# Patient Record
Sex: MALE | Race: WHITE | NOT HISPANIC OR LATINO | Employment: OTHER | URBAN - METROPOLITAN AREA
[De-identification: names, ages, dates, MRNs, and addresses within clinical notes are randomized per-mention and may not be internally consistent; named-entity substitution may affect disease eponyms.]

---

## 2023-03-25 ENCOUNTER — ANESTHESIA (INPATIENT)
Dept: PERIOP | Facility: HOSPITAL | Age: 78
End: 2023-03-25

## 2023-03-25 ENCOUNTER — APPOINTMENT (EMERGENCY)
Dept: RADIOLOGY | Facility: HOSPITAL | Age: 78
End: 2023-03-25

## 2023-03-25 ENCOUNTER — HOSPITAL ENCOUNTER (INPATIENT)
Facility: HOSPITAL | Age: 78
LOS: 2 days | Discharge: NURSING, REHAB OR LONG-TERM CARE | End: 2023-03-27
Attending: EMERGENCY MEDICINE | Admitting: INTERNAL MEDICINE

## 2023-03-25 ENCOUNTER — APPOINTMENT (INPATIENT)
Dept: RADIOLOGY | Facility: HOSPITAL | Age: 78
End: 2023-03-25

## 2023-03-25 ENCOUNTER — ANESTHESIA EVENT (INPATIENT)
Dept: PERIOP | Facility: HOSPITAL | Age: 78
End: 2023-03-25

## 2023-03-25 ENCOUNTER — APPOINTMENT (INPATIENT)
Dept: NON INVASIVE DIAGNOSTICS | Facility: HOSPITAL | Age: 78
End: 2023-03-25

## 2023-03-25 DIAGNOSIS — S72.141A CLOSED DISPLACED INTERTROCHANTERIC FRACTURE OF RIGHT FEMUR, INITIAL ENCOUNTER (HCC): Primary | ICD-10-CM

## 2023-03-25 DIAGNOSIS — T14.8XXA FRACTURE: ICD-10-CM

## 2023-03-25 DIAGNOSIS — D64.9 ANEMIA: ICD-10-CM

## 2023-03-25 DIAGNOSIS — S72.009A HIP FRACTURE (HCC): ICD-10-CM

## 2023-03-25 DIAGNOSIS — F20.9 SCHIZOPHRENIA (HCC): ICD-10-CM

## 2023-03-25 DIAGNOSIS — K21.9 GERD (GASTROESOPHAGEAL REFLUX DISEASE): ICD-10-CM

## 2023-03-25 DIAGNOSIS — R94.31 ABNORMAL EKG: ICD-10-CM

## 2023-03-25 DIAGNOSIS — K59.00 CONSTIPATION: ICD-10-CM

## 2023-03-25 DIAGNOSIS — R93.89 ABNORMAL CT SCAN: ICD-10-CM

## 2023-03-25 DIAGNOSIS — E83.42 HYPOMAGNESEMIA: ICD-10-CM

## 2023-03-25 PROBLEM — D72.829 LEUKOCYTOSIS: Status: ACTIVE | Noted: 2023-03-25

## 2023-03-25 PROBLEM — S72.001A CLOSED RIGHT HIP FRACTURE (HCC): Status: ACTIVE | Noted: 2023-03-25

## 2023-03-25 PROBLEM — M25.559 HIP PAIN: Status: ACTIVE | Noted: 2023-03-25

## 2023-03-25 PROBLEM — I10 HYPERTENSION: Status: ACTIVE | Noted: 2023-03-25

## 2023-03-25 LAB
ABO GROUP BLD: NORMAL
ABO GROUP BLD: NORMAL
ALBUMIN SERPL BCP-MCNC: 3.2 G/DL (ref 3.5–5)
ALP SERPL-CCNC: 89 U/L (ref 34–104)
ALT SERPL W P-5'-P-CCNC: 10 U/L (ref 7–52)
ANION GAP SERPL CALCULATED.3IONS-SCNC: 8 MMOL/L (ref 4–13)
AORTIC ROOT: 3.3 CM
AORTIC VALVE MEAN VELOCITY: 13.8 M/S
APICAL FOUR CHAMBER EJECTION FRACTION: 47 %
APTT PPP: 39 SECONDS (ref 23–37)
AST SERPL W P-5'-P-CCNC: 12 U/L (ref 13–39)
AV AREA BY CONTINUOUS VTI: 2.3 CM2
AV AREA PEAK VELOCITY: 1.8 CM2
AV LVOT MEAN GRADIENT: 3 MMHG
AV LVOT PEAK GRADIENT: 6 MMHG
AV MEAN GRADIENT: 9 MMHG
AV PEAK GRADIENT: 15 MMHG
BASOPHILS # BLD AUTO: 0.04 THOUSANDS/ÂΜL (ref 0–0.1)
BASOPHILS NFR BLD AUTO: 0 % (ref 0–1)
BILIRUB SERPL-MCNC: 0.29 MG/DL (ref 0.2–1)
BLD GP AB SCN SERPL QL: NEGATIVE
BUN SERPL-MCNC: 14 MG/DL (ref 5–25)
CALCIUM ALBUM COR SERPL-MCNC: 9.4 MG/DL (ref 8.3–10.1)
CALCIUM SERPL-MCNC: 8.8 MG/DL (ref 8.4–10.2)
CHLORIDE SERPL-SCNC: 104 MMOL/L (ref 96–108)
CO2 SERPL-SCNC: 26 MMOL/L (ref 21–32)
CREAT SERPL-MCNC: 0.64 MG/DL (ref 0.6–1.3)
DOP CALC AO PEAK VEL: 1.95 M/S
DOP CALC AO VTI: 28.97 CM
DOP CALC LVOT DIAMETER: 1.9 CM
DOP CALC LVOT PEAK VEL VTI: 23.56 CM
DOP CALC LVOT PEAK VEL: 1.25 M/S
DOP CALC LVOT STROKE VOLUME: 67
E WAVE DECELERATION TIME: 254 MS
E/A RATIO: 0.62
EOSINOPHIL # BLD AUTO: 0.12 THOUSAND/ÂΜL (ref 0–0.61)
EOSINOPHIL NFR BLD AUTO: 1 % (ref 0–6)
ERYTHROCYTE [DISTWIDTH] IN BLOOD BY AUTOMATED COUNT: 16.7 % (ref 11.6–15.1)
FERRITIN SERPL-MCNC: 151 NG/ML (ref 8–388)
FOLATE SERPL-MCNC: 15.4 NG/ML (ref 3.1–17.5)
FRACTIONAL SHORTENING: 19 (ref 28–44)
GFR SERPL CREATININE-BSD FRML MDRD: 93 ML/MIN/1.73SQ M
GLUCOSE SERPL-MCNC: 131 MG/DL (ref 65–140)
HCT VFR BLD AUTO: 30.4 % (ref 36.5–49.3)
HGB BLD-MCNC: 9.5 G/DL (ref 12–17)
IMM GRANULOCYTES # BLD AUTO: 0.14 THOUSAND/UL (ref 0–0.2)
IMM GRANULOCYTES NFR BLD AUTO: 1 % (ref 0–2)
INR PPP: 1.17 (ref 0.84–1.19)
INTERVENTRICULAR SEPTUM IN DIASTOLE (PARASTERNAL SHORT AXIS VIEW): 1.2 CM
INTERVENTRICULAR SEPTUM: 1.2 CM (ref 0.6–1.1)
IRON SATN MFR SERPL: 10 % (ref 20–50)
IRON SERPL-MCNC: 22 UG/DL (ref 65–175)
LAAS-AP4: 15.4 CM2
LEFT ATRIUM AREA SYSTOLE SINGLE PLANE A4C: 16.4 CM2
LEFT ATRIUM SIZE: 3.2 CM
LEFT INTERNAL DIMENSION IN SYSTOLE: 3.4 CM (ref 2.1–4)
LEFT VENTRICLE DIASTOLIC VOLUME (MOD BIPLANE): 125 ML
LEFT VENTRICLE SYSTOLIC VOLUME (MOD BIPLANE): 62 ML
LEFT VENTRICULAR INTERNAL DIMENSION IN DIASTOLE: 4.2 CM (ref 3.5–6)
LEFT VENTRICULAR POSTERIOR WALL IN END DIASTOLE: 1.2 CM
LEFT VENTRICULAR STROKE VOLUME: 32 ML
LV EF: 50 %
LVSV (TEICH): 32 ML
LYMPHOCYTES # BLD AUTO: 1.31 THOUSANDS/ÂΜL (ref 0.6–4.47)
LYMPHOCYTES NFR BLD AUTO: 10 % (ref 14–44)
MCH RBC QN AUTO: 26 PG (ref 26.8–34.3)
MCHC RBC AUTO-ENTMCNC: 31.3 G/DL (ref 31.4–37.4)
MCV RBC AUTO: 83 FL (ref 82–98)
MONOCYTES # BLD AUTO: 0.87 THOUSAND/ÂΜL (ref 0.17–1.22)
MONOCYTES NFR BLD AUTO: 7 % (ref 4–12)
MV E'TISSUE VEL-LAT: 14 CM/S
MV E'TISSUE VEL-SEP: 9 CM/S
MV PEAK A VEL: 1.29 M/S
MV PEAK E VEL: 80 CM/S
MV STENOSIS PRESSURE HALF TIME: 74 MS
MV VALVE AREA P 1/2 METHOD: 3
NEUTROPHILS # BLD AUTO: 10.11 THOUSANDS/ÂΜL (ref 1.85–7.62)
NEUTS SEG NFR BLD AUTO: 81 % (ref 43–75)
NRBC BLD AUTO-RTO: 0 /100 WBCS
PLATELET # BLD AUTO: 369 THOUSANDS/UL (ref 149–390)
PMV BLD AUTO: 9.8 FL (ref 8.9–12.7)
POTASSIUM SERPL-SCNC: 4.2 MMOL/L (ref 3.5–5.3)
PROT SERPL-MCNC: 7.1 G/DL (ref 6.4–8.4)
PROTHROMBIN TIME: 15 SECONDS (ref 11.6–14.5)
PV PEAK GRADIENT: 11 MMHG
RBC # BLD AUTO: 3.65 MILLION/UL (ref 3.88–5.62)
RH BLD: POSITIVE
RH BLD: POSITIVE
RIGHT ATRIUM AREA SYSTOLE A4C: 13.8 CM2
RIGHT VENTRICLE ID DIMENSION: 3.4 CM
SL CV LV EF: 45
SL CV PED ECHO LEFT VENTRICLE DIASTOLIC VOLUME (MOD BIPLANE) 2D: 80 ML
SL CV PED ECHO LEFT VENTRICLE SYSTOLIC VOLUME (MOD BIPLANE) 2D: 48 ML
SODIUM SERPL-SCNC: 138 MMOL/L (ref 135–147)
SPECIMEN EXPIRATION DATE: NORMAL
TIBC SERPL-MCNC: 231 UG/DL (ref 250–450)
TR MAX PG: 25 MMHG
TR PEAK VELOCITY: 2.5 M/S
TRICUSPID ANNULAR PLANE SYSTOLIC EXCURSION: 2.1 CM
TRICUSPID VALVE PEAK REGURGITATION VELOCITY: 2.52 M/S
VIT B12 SERPL-MCNC: 277 PG/ML (ref 100–900)
WBC # BLD AUTO: 12.59 THOUSAND/UL (ref 4.31–10.16)

## 2023-03-25 PROCEDURE — 0QS606Z REPOSITION RIGHT UPPER FEMUR WITH INTRAMEDULLARY INTERNAL FIXATION DEVICE, OPEN APPROACH: ICD-10-PCS | Performed by: ORTHOPAEDIC SURGERY

## 2023-03-25 DEVICE — TFNA FENESTRATED HELICAL BLADE 105MM - STERILE
Type: IMPLANTABLE DEVICE | Status: FUNCTIONAL
Brand: TFN-ADVANCE

## 2023-03-25 DEVICE — 12MM/130 DEG TI CANN TFNA 170MM - STERILE
Type: IMPLANTABLE DEVICE | Status: FUNCTIONAL
Brand: TFN-ADVANCE

## 2023-03-25 DEVICE — 5.0MM TI LOCKING SCREW W/T25 STARDRIVE 32MM FOR IM NAILS: Type: IMPLANTABLE DEVICE | Status: FUNCTIONAL

## 2023-03-25 RX ORDER — CEFAZOLIN SODIUM 1 G/50ML
1000 SOLUTION INTRAVENOUS ONCE
Status: DISCONTINUED | OUTPATIENT
Start: 2023-03-25 | End: 2023-03-25 | Stop reason: HOSPADM

## 2023-03-25 RX ORDER — HYDROMORPHONE HCL IN WATER/PF 6 MG/30 ML
0.2 PATIENT CONTROLLED ANALGESIA SYRINGE INTRAVENOUS EVERY 4 HOURS PRN
Status: DISCONTINUED | OUTPATIENT
Start: 2023-03-25 | End: 2023-03-27 | Stop reason: HOSPADM

## 2023-03-25 RX ORDER — CEFAZOLIN SODIUM 2 G/50ML
SOLUTION INTRAVENOUS AS NEEDED
Status: DISCONTINUED | OUTPATIENT
Start: 2023-03-25 | End: 2023-03-25

## 2023-03-25 RX ORDER — MORPHINE SULFATE 4 MG/ML
4 INJECTION, SOLUTION INTRAMUSCULAR; INTRAVENOUS ONCE
Status: COMPLETED | OUTPATIENT
Start: 2023-03-25 | End: 2023-03-25

## 2023-03-25 RX ORDER — AMLODIPINE BESYLATE 5 MG/1
5 TABLET ORAL DAILY
Status: DISCONTINUED | OUTPATIENT
Start: 2023-03-25 | End: 2023-03-27 | Stop reason: HOSPADM

## 2023-03-25 RX ORDER — ENOXAPARIN SODIUM 100 MG/ML
40 INJECTION SUBCUTANEOUS
Status: DISCONTINUED | OUTPATIENT
Start: 2023-03-26 | End: 2023-03-27 | Stop reason: HOSPADM

## 2023-03-25 RX ORDER — CEFAZOLIN SODIUM 1 G/50ML
1000 SOLUTION INTRAVENOUS EVERY 8 HOURS
Status: COMPLETED | OUTPATIENT
Start: 2023-03-25 | End: 2023-03-26

## 2023-03-25 RX ORDER — ACETAMINOPHEN 325 MG/1
650 TABLET ORAL EVERY 6 HOURS PRN
Status: DISCONTINUED | OUTPATIENT
Start: 2023-03-25 | End: 2023-03-27 | Stop reason: HOSPADM

## 2023-03-25 RX ORDER — EPHEDRINE SULFATE 50 MG/ML
INJECTION INTRAVENOUS AS NEEDED
Status: DISCONTINUED | OUTPATIENT
Start: 2023-03-25 | End: 2023-03-25

## 2023-03-25 RX ORDER — CLONAZEPAM 0.5 MG/1
0.25 TABLET ORAL DAILY
Status: DISCONTINUED | OUTPATIENT
Start: 2023-03-25 | End: 2023-03-27 | Stop reason: HOSPADM

## 2023-03-25 RX ORDER — CLONAZEPAM 0.25 MG/1
0.25 TABLET, ORALLY DISINTEGRATING ORAL DAILY
COMMUNITY

## 2023-03-25 RX ORDER — TRANEXAMIC ACID 100 MG/ML
INJECTION, SOLUTION INTRAVENOUS AS NEEDED
Status: DISCONTINUED | OUTPATIENT
Start: 2023-03-25 | End: 2023-03-25

## 2023-03-25 RX ORDER — PROPOFOL 10 MG/ML
INJECTION, EMULSION INTRAVENOUS AS NEEDED
Status: DISCONTINUED | OUTPATIENT
Start: 2023-03-25 | End: 2023-03-25

## 2023-03-25 RX ORDER — METOPROLOL TARTRATE 50 MG/1
50 TABLET, FILM COATED ORAL EVERY 12 HOURS SCHEDULED
COMMUNITY

## 2023-03-25 RX ORDER — LIDOCAINE HYDROCHLORIDE 10 MG/ML
INJECTION, SOLUTION EPIDURAL; INFILTRATION; INTRACAUDAL; PERINEURAL AS NEEDED
Status: DISCONTINUED | OUTPATIENT
Start: 2023-03-25 | End: 2023-03-25

## 2023-03-25 RX ORDER — FENTANYL CITRATE/PF 50 MCG/ML
25 SYRINGE (ML) INJECTION
Status: DISCONTINUED | OUTPATIENT
Start: 2023-03-25 | End: 2023-03-25 | Stop reason: HOSPADM

## 2023-03-25 RX ORDER — CEFAZOLIN SODIUM 1 G/50ML
1000 SOLUTION INTRAVENOUS EVERY 8 HOURS
Status: CANCELLED | OUTPATIENT
Start: 2023-03-25 | End: 2023-03-26

## 2023-03-25 RX ORDER — LOSARTAN POTASSIUM 50 MG/1
50 TABLET ORAL DAILY
COMMUNITY

## 2023-03-25 RX ORDER — OXYCODONE HYDROCHLORIDE 5 MG/1
5 TABLET ORAL EVERY 4 HOURS PRN
Status: DISCONTINUED | OUTPATIENT
Start: 2023-03-25 | End: 2023-03-27 | Stop reason: HOSPADM

## 2023-03-25 RX ORDER — DEXAMETHASONE SODIUM PHOSPHATE 10 MG/ML
INJECTION, SOLUTION INTRAMUSCULAR; INTRAVENOUS AS NEEDED
Status: DISCONTINUED | OUTPATIENT
Start: 2023-03-25 | End: 2023-03-25

## 2023-03-25 RX ORDER — METOPROLOL TARTRATE 50 MG/1
50 TABLET, FILM COATED ORAL EVERY 12 HOURS SCHEDULED
Status: DISCONTINUED | OUTPATIENT
Start: 2023-03-25 | End: 2023-03-27 | Stop reason: HOSPADM

## 2023-03-25 RX ORDER — ONDANSETRON 2 MG/ML
4 INJECTION INTRAMUSCULAR; INTRAVENOUS EVERY 6 HOURS PRN
Status: DISCONTINUED | OUTPATIENT
Start: 2023-03-25 | End: 2023-03-27 | Stop reason: HOSPADM

## 2023-03-25 RX ORDER — DIVALPROEX SODIUM 125 MG/1
125 TABLET, DELAYED RELEASE ORAL EVERY 8 HOURS SCHEDULED
COMMUNITY

## 2023-03-25 RX ORDER — MIDAZOLAM HYDROCHLORIDE 2 MG/2ML
INJECTION, SOLUTION INTRAMUSCULAR; INTRAVENOUS AS NEEDED
Status: DISCONTINUED | OUTPATIENT
Start: 2023-03-25 | End: 2023-03-25

## 2023-03-25 RX ORDER — SODIUM CHLORIDE 9 MG/ML
100 INJECTION, SOLUTION INTRAVENOUS CONTINUOUS
Status: DISCONTINUED | OUTPATIENT
Start: 2023-03-25 | End: 2023-03-26

## 2023-03-25 RX ORDER — CEFAZOLIN SODIUM 1 G/50ML
1000 SOLUTION INTRAVENOUS EVERY 8 HOURS
Status: DISCONTINUED | OUTPATIENT
Start: 2023-03-25 | End: 2023-03-25

## 2023-03-25 RX ORDER — FENTANYL CITRATE 50 UG/ML
INJECTION, SOLUTION INTRAMUSCULAR; INTRAVENOUS AS NEEDED
Status: DISCONTINUED | OUTPATIENT
Start: 2023-03-25 | End: 2023-03-25

## 2023-03-25 RX ORDER — ASPIRIN 81 MG/1
81 TABLET, CHEWABLE ORAL 2 TIMES DAILY
Status: DISCONTINUED | OUTPATIENT
Start: 2023-03-25 | End: 2023-03-26

## 2023-03-25 RX ORDER — AMLODIPINE BESYLATE 5 MG/1
5 TABLET ORAL DAILY
COMMUNITY

## 2023-03-25 RX ORDER — DIVALPROEX SODIUM 125 MG/1
125 TABLET, DELAYED RELEASE ORAL EVERY 8 HOURS SCHEDULED
Status: DISCONTINUED | OUTPATIENT
Start: 2023-03-25 | End: 2023-03-27 | Stop reason: HOSPADM

## 2023-03-25 RX ORDER — DOCUSATE SODIUM 100 MG/1
100 CAPSULE, LIQUID FILLED ORAL 2 TIMES DAILY
Status: DISCONTINUED | OUTPATIENT
Start: 2023-03-25 | End: 2023-03-27 | Stop reason: HOSPADM

## 2023-03-25 RX ADMIN — SODIUM CHLORIDE 100 ML/HR: 0.9 INJECTION, SOLUTION INTRAVENOUS at 22:22

## 2023-03-25 RX ADMIN — TRANEXAMIC ACID 1000 MG: 1 INJECTION, SOLUTION INTRAVENOUS at 07:08

## 2023-03-25 RX ADMIN — TRANEXAMIC ACID 1 G: 1 INJECTION, SOLUTION INTRAVENOUS at 13:55

## 2023-03-25 RX ADMIN — LIDOCAINE HYDROCHLORIDE 50 MG: 10 INJECTION, SOLUTION EPIDURAL; INFILTRATION; INTRACAUDAL; PERINEURAL at 14:18

## 2023-03-25 RX ADMIN — FENTANYL CITRATE 25 MCG: 50 INJECTION, SOLUTION INTRAMUSCULAR; INTRAVENOUS at 14:34

## 2023-03-25 RX ADMIN — FENTANYL CITRATE 25 MCG: 50 INJECTION, SOLUTION INTRAMUSCULAR; INTRAVENOUS at 14:43

## 2023-03-25 RX ADMIN — MORPHINE SULFATE 4 MG: 4 INJECTION INTRAVENOUS at 04:48

## 2023-03-25 RX ADMIN — FENTANYL CITRATE 50 MCG: 50 INJECTION, SOLUTION INTRAMUSCULAR; INTRAVENOUS at 14:18

## 2023-03-25 RX ADMIN — TRANEXAMIC ACID 670 MG: 1 INJECTION, SOLUTION INTRAVENOUS at 06:46

## 2023-03-25 RX ADMIN — DIVALPROEX SODIUM 125 MG: 125 TABLET, DELAYED RELEASE ORAL at 16:03

## 2023-03-25 RX ADMIN — MIDAZOLAM 2 MG: 1 INJECTION INTRAMUSCULAR; INTRAVENOUS at 14:06

## 2023-03-25 RX ADMIN — CEFAZOLIN SODIUM 2000 MG: 2 SOLUTION INTRAVENOUS at 14:15

## 2023-03-25 RX ADMIN — EPHEDRINE SULFATE 10 MG: 50 INJECTION, SOLUTION INTRAVENOUS at 14:32

## 2023-03-25 RX ADMIN — SODIUM CHLORIDE 100 ML/HR: 0.9 INJECTION, SOLUTION INTRAVENOUS at 12:03

## 2023-03-25 RX ADMIN — DOCUSATE SODIUM 100 MG: 100 CAPSULE, LIQUID FILLED ORAL at 12:03

## 2023-03-25 RX ADMIN — DOCUSATE SODIUM 100 MG: 100 CAPSULE, LIQUID FILLED ORAL at 17:15

## 2023-03-25 RX ADMIN — AMLODIPINE BESYLATE 5 MG: 5 TABLET ORAL at 12:03

## 2023-03-25 RX ADMIN — METOPROLOL TARTRATE 50 MG: 50 TABLET, FILM COATED ORAL at 12:03

## 2023-03-25 RX ADMIN — ASPIRIN 81 MG CHEWABLE TABLET 81 MG: 81 TABLET CHEWABLE at 22:02

## 2023-03-25 RX ADMIN — DEXAMETHASONE SODIUM PHOSPHATE 4 MG: 10 INJECTION, SOLUTION INTRAMUSCULAR; INTRAVENOUS at 14:27

## 2023-03-25 RX ADMIN — ONDANSETRON 4 MG: 2 INJECTION INTRAMUSCULAR; INTRAVENOUS at 14:18

## 2023-03-25 RX ADMIN — MORPHINE SULFATE 4 MG: 4 INJECTION INTRAVENOUS at 05:38

## 2023-03-25 RX ADMIN — CEFAZOLIN SODIUM 1000 MG: 1 SOLUTION INTRAVENOUS at 22:02

## 2023-03-25 RX ADMIN — DIVALPROEX SODIUM 125 MG: 125 TABLET, DELAYED RELEASE ORAL at 22:03

## 2023-03-25 RX ADMIN — CLONAZEPAM 0.25 MG: 0.5 TABLET ORAL at 12:03

## 2023-03-25 RX ADMIN — PROPOFOL 150 MG: 10 INJECTION, EMULSION INTRAVENOUS at 14:47

## 2023-03-25 NOTE — OP NOTE
OPERATIVE REPORT  PATIENT NAME: Nelson Phelps    :  1945  MRN: [id number]  Pt Location: [facility] ROOM 03    SURGERY DATE: 3/25/2023    Surgeon(s) and Role:     * Naga Mcdonald MD - Primary     * Monica Kenny PA-C - Assisting    Preop Diagnosis:  Closed displaced intertrochanteric fracture of right femur, initial encounter ([place] [address] ) Jeannette Emerson    Post-Op Diagnosis Codes:     * Closed displaced intertrochanteric fracture of right femur, initial encounter ([place] [address] ) Jeannette Emerson    Procedure(s):  Right - INSERTION NAIL IM FEMUR ANTEGRADE (TROCHANTERIC)    Specimen(s):  * No specimens in log *    Estimated Blood Loss:   Minimal    Drains:  * No LDAs found *    Anesthesia Type:   General    Operative Indications:  Closed displaced intertrochanteric fracture of right femur, initial encounter ([place] [address] ) [S72 141A]    Operative Findings:  Closed displaced comminuted intertrochanteric right femoral fracture    Complications:   None    Procedure and Technique:  After informed surgical consent of been obtained patient was brought to the operating room and administered general anesthesia  He was then placed in the supine position on the operating room table and all questions and restraints were placed  Right lower extremity was held in traction and internal rotation in the left hip was flexed and abducted out of the way  Using the standard starting wound dissection was taken down through skin and subcutaneous tissues to the level of the greater trochanter  The guidewire was placed down the canal and into the distal fragment  The femur was reamed up to 13 5 mm and a Synthes 12 mm trochanteric femoral nail was placed down the canal   The guidewire was placed  I placed helical blade low in the head in order to ensure that we were out of the comminuted segment  This was locked into place  The distal interlocking screw was placed  Final fluoroscopic views were obtained showing everything to be in good position      Wounds were then closed with 2-0 Vicryl staples  Sterile dressings were applied  He was then awakened from general anesthesia and transferred to recovery in stable condition having tolerated the procedure well     I was present for the entire procedure, I was present for all critical portions of the procedure, A qualified resident physician was not available and A physician assistant was required during the procedure for retraction tissue handling,dissection and suturing    Patient Disposition:  PACU         SIGNATURE: Bradly Obregon MD  DATE: March 25, 2023  TIME: 3:00 PM

## 2023-03-25 NOTE — CONSULTS
"Consultation - Orthopedics   Madison Dominguez 65 y o  male MRN: [id number]  Unit/Bed#: ED 10 Encounter: [id number]      Physician Requesting Consult: Sonia Belle MD    Reason for Consult / Principal Problem: Right intertrochanteric femur fracture  HPI:   Madison Dominguez is a 65y o  year old male who sustained a mechanical fall at his assisted living facility earlier this morning  He typically ambulates with a walker and notes he \"lost his balance \"  Due to significant right hip pain he was brought to the emergency department where x-rays and CT showed a comminuted displaced intertrochanteric right femur fracture  The patient notes ongoing pain about the right groin, which is worse with attempted movement and better with rest and pain medication  Patient notes good sensation of the right lower extremity  He denies any chest pain, shortness of breath, or dizziness  Patient does have a history of schizophrenia and hypertension, but denies any other significant past medical history  Patient notes he has lived in assisted living for the last 6 months  ECG done in ED showed  Normal sinus rhythm  Right atrial enlargement  Incomplete right bundle branch block  Possible lateral infarct, age undetermined  Possible inferior infarct, age undetermined  T wave abnormality anteriorly  No STEMI  Hemoglobin 9 5  Consults    Review of Systems   Constitutional: Negative for chills, fever and unexpected weight change  HENT: Negative for hearing loss, nosebleeds and sore throat  Eyes: Negative for pain, redness and visual disturbance  Respiratory: Negative for cough, shortness of breath and wheezing  Cardiovascular: Negative for chest pain, palpitations and leg swelling  Gastrointestinal: Negative for abdominal pain, nausea and vomiting  Endocrine: Negative for polyphagia and polyuria  Genitourinary: Negative for dysuria and hematuria     Musculoskeletal:        See HPI   Skin: Negative for " rash and wound  Neurological: Negative for dizziness, numbness and headaches  Psychiatric/Behavioral: Negative for decreased concentration and suicidal ideas  The patient is not nervous/anxious  Historical Information   Past Medical History:   Diagnosis Date   • Hypertension    • Schizophrenia ([facility] [address] )      Past Surgical History:   Procedure Laterality Date   • CHOLECYSTECTOMY     • TONSILECTOMY AND ADNOIDECTOMY       Social History   Social History     Substance and Sexual Activity   Alcohol Use None     Social History     Substance and Sexual Activity   Drug Use Not on file     Social History     Tobacco Use   Smoking Status Not on file   Smokeless Tobacco Not on file     Family History: No family history on file  Meds/Allergies   all current active meds have been reviewed and current meds:   Current Facility-Administered Medications   Medication Dose Route Frequency   • tranexamic Acid 1,000 mg in sodium chloride 0 9 % 500 mL IVPB  1,000 mg Intravenous Once     No Known Allergies    Objective   Vitals: Blood pressure 145/67, pulse 62, temperature 98 8 °F (37 1 °C), temperature source Oral, resp  rate 18, weight 67 1 kg (147 lb 14 9 oz), SpO2 99 %  ,There is no height or weight on file to calculate BMI  Invasive Devices     Peripheral Intravenous Line  Duration           Peripheral IV 03/25/23 Dorsal (posterior); Right Forearm <1 day    Peripheral IV 03/25/23 Right Antecubital <1 day                Physical Exam  Constitutional:       General: He is not in acute distress  Appearance: He is well-developed  HENT:      Head: Normocephalic and atraumatic  Eyes:      General: No scleral icterus  Conjunctiva/sclera: Conjunctivae normal    Neck:      Vascular: No JVD  Cardiovascular:      Rate and Rhythm: Normal rate  Pulmonary:      Effort: Pulmonary effort is normal  No respiratory distress  Skin:     General: Skin is warm     Neurological:      Mental Status: He is alert and oriented to person, place, and time  Coordination: Coordination normal           Ortho Exam   Patient alert and oriented x3 lying in bed  Patient holds the right lower extremity in a flexed and internally rotated position  Benign appearance of the right hip  Sensation intact right lower extremity  Patient moves foot and ankle freely  2+ DP pulse  All compartments soft  Lab Results: I have personally reviewed pertinent lab results  CBC:   Lab Results   Component Value Date    WBC 12 59 (H) 03/25/2023    HGB 9 5 (L) 03/25/2023    HCT 30 4 (L) 03/25/2023    MCV 83 03/25/2023     03/25/2023    MCH 26 0 (L) 03/25/2023    MCHC 31 3 (L) 03/25/2023    RDW 16 7 (H) 03/25/2023    MPV 9 8 03/25/2023    NRBC 0 03/25/2023     CMP:   Lab Results   Component Value Date     03/25/2023    CO2 26 03/25/2023    BUN 14 03/25/2023    CREATININE 0 64 03/25/2023    CALCIUM 8 8 03/25/2023    AST 12 (L) 03/25/2023    ALT 10 03/25/2023    ALKPHOS 89 03/25/2023    EGFR 93 03/25/2023     PT/INR: No results found for: PT, INR    Imaging Studies: I have personally reviewed pertinent reports  and I have personally reviewed pertinent films in PACS  CT right lower extremity: Comminuted, displaced right intertrochanteric femur fracture  Assessment:  Right intertrochanteric femur fracture  Plan:  The patient has a right intertrochanteric femur fracture that required surgical fixation  The patient will be admitted to the internal medicine service  Dr Lesa Robles plans to perform surgical fixation of this fracture this afternoon  Cardiology has been consulted for clearance as the patient did have abnormal ECG findings  Patient has been NPO  TXA was administered in ED  Nonweightbearing right lower extremity  Analgesia as needed  Patient did come in with baseline anemia  We will continue to monitor hemoglobin postoperatively  Type and screen was ordered        Code Status: No Order  Advance Directive and Living Will:      Power of :    POLST:

## 2023-03-25 NOTE — ASSESSMENT & PLAN NOTE
CT scan of the pelvis incidentally noted a 17 x 14 mm soft tissue nodule noted adjacent to the rectus sigmoid junction, to the right of midline Which may represent enlarged lymph node  Tissue sampling and/or PET/CT was recommended for further evaluation  Discussed findings with the patient, we will recommend follow-up on discharge  Will refer to hematology/oncology as outpatient    Patient reports that he has been following with his physician in the past and may consider to follow-up with his permission after discharge but would appreciate information regarding local provider for referral

## 2023-03-25 NOTE — ANESTHESIA PREPROCEDURE EVALUATION
Procedure:  INSERTION NAIL IM FEMUR ANTEGRADE (TROCHANTERIC) (Right: Leg Upper)    Relevant Problems   CARDIO   (+) Hypertension      HEMATOLOGY   (+) Anemia      NEURO/PSYCH   (+) Schizophrenia (HCC)        Physical Exam    Airway    Mallampati score: III         Dental       Cardiovascular  Rhythm: regular, Rate: normal,     Pulmonary  Breath sounds clear to auscultation,     Other Findings        Anesthesia Plan  ASA Score- 2     Anesthesia Type- general with ASA Monitors  Additional Monitors:   Airway Plan: LMA  Plan Factors-Exercise tolerance (METS): >4 METS  Chart reviewed  EKG reviewed  Existing labs reviewed  Patient summary reviewed  Patient is not a current smoker  Induction- intravenous  Postoperative Plan- Plan for postoperative opioid use  Informed Consent- Anesthetic plan and risks discussed with patient  I personally reviewed this patient with the CRNA  Discussed and agreed on the Anesthesia Plan with the CRNA  Courtney Banuelos

## 2023-03-25 NOTE — PLAN OF CARE
Problem: Potential for Falls  Goal: Patient will remain free of falls  Description: INTERVENTIONS:  - Educate patient/family on patient safety including physical limitations  - Instruct patient to call for assistance with activity   - Consult OT/PT to assist with strengthening/mobility   - Keep Call bell within reach  - Keep bed low and locked with side rails adjusted as appropriate  - Keep care items and personal belongings within reach  - Initiate and maintain comfort rounds  - Make Fall Risk Sign visible to staff  - Offer Toileting every  Hours, in advance of need  - Initiate/Maintain alarm  - Obtain necessary fall risk management equipment:   - Apply yellow socks and bracelet for high fall risk patients  - Consider moving patient to room near nurses station  Outcome: Progressing     Problem: MOBILITY - ADULT  Goal: Maintain or return to baseline ADL function  Description: INTERVENTIONS:  -  Assess patient's ability to carry out ADLs; assess patient's baseline for ADL function and identify physical deficits which impact ability to perform ADLs (bathing, care of mouth/teeth, toileting, grooming, dressing, etc )  - Assess/evaluate cause of self-care deficits   - Assess range of motion  - Assess patient's mobility; develop plan if impaired  - Assess patient's need for assistive devices and provide as appropriate  - Encourage maximum independence but intervene and supervise when necessary  - Involve family in performance of ADLs  - Assess for home care needs following discharge   - Consider OT consult to assist with ADL evaluation and planning for discharge  - Provide patient education as appropriate  Outcome: Progressing  Goal: Maintains/Returns to pre admission functional level  Description: INTERVENTIONS:  - Perform BMAT or MOVE assessment daily    - Set and communicate daily mobility goal to care team and patient/family/caregiver     - Collaborate with rehabilitation services on mobility goals if consulted  - Perform Range of Motion times a day  - Reposition patient every  hours    - Dangle patient  times a day  - Stand patient times a day  - Ambulate patient  times a day  - Out of bed to chair  times a day   - Out of bed for meals  times a day  - Out of bed for toileting  - Record patient progress and toleration of activity level   Outcome: Progressing     Problem: Prexisting or High Potential for Compromised Skin Integrity  Goal: Skin integrity is maintained or improved  Description: INTERVENTIONS:  - Identify patients at risk for skin breakdown  - Assess and monitor skin integrity  - Assess and monitor nutrition and hydration status  - Monitor labs   - Assess for incontinence   - Turn and reposition patient  - Assist with mobility/ambulation  - Relieve pressure over bony prominences  - Avoid friction and shearing  - Provide appropriate hygiene as needed including keeping skin clean and dry  - Evaluate need for skin moisturizer/barrier cream  - Collaborate with interdisciplinary team   - Patient/family teaching  - Consider wound care consult   Outcome: Progressing     Problem: MUSCULOSKELETAL - ADULT  Goal: Maintain or return mobility to safest level of function  Description: INTERVENTIONS:  - Assess patient's ability to carry out ADLs; assess patient's baseline for ADL function and identify physical deficits which impact ability to perform ADLs (bathing, care of mouth/teeth, toileting, grooming, dressing, etc )  - Assess/evaluate cause of self-care deficits   - Assess range of motion  - Assess patient's mobility  - Assess patient's need for assistive devices and provide as appropriate  - Encourage maximum independence but intervene and supervise when necessary  - Involve family in performance of ADLs  - Assess for home care needs following discharge   - Consider OT consult to assist with ADL evaluation and planning for discharge  - Provide patient education as appropriate  Outcome: Progressing  Goal: Maintain proper alignment of affected body part  Description: INTERVENTIONS:  - Support, maintain and protect limb and body alignment  - Provide patient/ family with appropriate education  Outcome: Progressing     Problem: PAIN - ADULT  Goal: Verbalizes/displays adequate comfort level or baseline comfort level  Description: Interventions:  - Encourage patient to monitor pain and request assistance  - Assess pain using appropriate pain scale  - Administer analgesics based on type and severity of pain and evaluate response  - Implement non-pharmacological measures as appropriate and evaluate response  - Consider cultural and social influences on pain and pain management  - Notify physician/advanced practitioner if interventions unsuccessful or patient reports new pain  Outcome: Progressing     Problem: SAFETY ADULT  Goal: Patient will remain free of falls  Description: INTERVENTIONS:  - Educate patient/family on patient safety including physical limitations  - Instruct patient to call for assistance with activity   - Consult OT/PT to assist with strengthening/mobility   - Keep Call bell within reach  - Keep bed low and locked with side rails adjusted as appropriate  - Keep care items and personal belongings within reach  - Initiate and maintain comfort rounds  - Make Fall Risk Sign visible to staff  - Offer Toileting every  Hours, in advance of need  - Initiate/Maintain alarm  - Obtain necessary fall risk management equipment:   - Apply yellow socks and bracelet for high fall risk patients  - Consider moving patient to room near nurses station  Outcome: Progressing

## 2023-03-25 NOTE — ED NOTES
Spoke with Ariella Wilkerson from [facility] [phone], updated on patient status        Essence Cooper, MICHAEL  03/25/23 [id number]

## 2023-03-25 NOTE — ASSESSMENT & PLAN NOTE
Presented after mechanical fall and right hip pain  Noted to have acute mildly displaced comminuted intertrochanteric fracture of the proximal right femur  The right hip joint space is intact  No free air or free fluid within the pelvis  Evaluated by orthopedic, recommended surgical patient  Seen by cardiology for perioperative evaluation  Status post ORIF right intertrochanteric femoral fracture with short IM, 3/25  Doing well postoperatively  · PT/OT, weightbearing as tolerated right lower extremity  · Follow-up labs, trend hemoglobin has remained stable after initial decline      · Pain control as needed, currently well controlled  · Follow-up labs as outpatient  · DVT prophylaxis postoperatively with Lovenox for 6 weeks  · Follow-up with orthopedics in 2 weeks

## 2023-03-25 NOTE — ANESTHESIA POSTPROCEDURE EVALUATION
Post-Op Assessment Note    CV Status:  Stable  Pain Score: 0    Pain management: adequate     Mental Status:  Somnolent, sleepy and arousable   Hydration Status:  Stable   PONV Controlled:  None   Airway Patency:  Patent  Airway: intubated   Two or more mitigation strategies used for obstructive sleep apnea   Post Op Vitals Reviewed: Yes      Staff: Anesthesiologist         No notable events documented      BP      Temp      Pulse     Resp      SpO2

## 2023-03-25 NOTE — ASSESSMENT & PLAN NOTE
Noted to have hemoglobin of 9 5 g/dL, normocytic  Hemoglobin was 11 2 g/dL on 5/22 on care everywhere  Patient with history of EGD in 7/21 with evidence of erosive gastritis and polyps were removed  Patient also reports that he had colonoscopy previously but not sure how long ago  Hemoglobin lower at 7 5 g/dL postoperatively  Reported minimal EBL Intra-Op  No evidence of overt bleeding  Hemoglobin remained stable after discontinuation of IV fluid  · Iron studies noted with low serum iron and iron sat    Depressed TIBC and normal ferritin  · Monitor hemoglobin  · We will continue PPI  · Iron supplementation with bowel regimen postoperatively  · GI referral on discharge

## 2023-03-25 NOTE — ED PROVIDER NOTES
History  Chief Complaint   Patient presents with   • Fall     Pt reports r hip pain after fall  Pt was getting up to use the restroom and went to grab walker and fell  Denies hitting head, denies thinners, denies loc     Patient is a 74-year-old male  He is not on oral anticoagulants  He has a history of hypertension and schizophrenia  Ambulates with a walker  Today he was getting up to use the bathroom when he missed his walker and fell on his right hip  He is complaining of severe right hip pain  He presents by ambulance  He did not strike his head  Denies neck or back pain  No focal motor or sensory complaints  No chest pain or trouble breathing  No abdominal pain  He denies any dizziness, lightheadedness, palpitations, or other medical symptoms prior to the fall  The pain is severe  It is worse with movement  Better with remaining still  Prior to Admission Medications   Prescriptions Last Dose Informant Patient Reported? Taking? amLODIPine (NORVASC) 5 mg tablet   Yes Yes   Sig: Take 5 mg by mouth daily   clonazePAM (KlonoPIN) 0 25 MG disintegrating tablet   Yes Yes   Sig: Take 0 25 mg by mouth daily   divalproex sodium (DEPAKOTE) 125 mg EC tablet   Yes Yes   Sig: Take 125 mg by mouth every 8 (eight) hours   losartan (COZAAR) 50 mg tablet   Yes Yes   Sig: Take 50 mg by mouth daily   metoprolol tartrate (LOPRESSOR) 50 mg tablet   Yes Yes   Sig: Take 50 mg by mouth every 12 (twelve) hours      Facility-Administered Medications: None       Past Medical History:   Diagnosis Date   • Hypertension    • Schizophrenia ([facility] [address] )        Past Surgical History:   Procedure Laterality Date   • CHOLECYSTECTOMY     • TONSILECTOMY AND ADNOIDECTOMY         No family history on file  I have reviewed and agree with the history as documented  E-Cigarette/Vaping     E-Cigarette/Vaping Substances          Review of Systems   Constitutional: Negative for chills and fever     HENT: Negative for rhinorrhea and sore throat  Eyes: Negative for pain, redness and visual disturbance  Respiratory: Negative for cough and shortness of breath  Cardiovascular: Negative for chest pain and leg swelling  Gastrointestinal: Negative for abdominal pain, diarrhea and vomiting  Endocrine: Negative for polydipsia and polyuria  Genitourinary: Negative for dysuria, frequency and hematuria  Musculoskeletal: Negative for back pain and neck pain  Skin: Negative for rash and wound  Allergic/Immunologic: Negative for immunocompromised state  Neurological: Negative for weakness, numbness and headaches  Psychiatric/Behavioral: Negative for hallucinations and suicidal ideas  All other systems reviewed and are negative  Physical Exam  Physical Exam  Vitals reviewed  Constitutional:       General: He is not in acute distress  Appearance: He is not toxic-appearing  HENT:      Head: Normocephalic and atraumatic  No raccoon eyes, Arnett's sign, abrasion, contusion or laceration  Jaw: No trismus, tenderness, pain on movement or malocclusion  Comments: No scalp swelling, step-off or lacerations  No facial bone crepitus, step-off or swelling  No facial lacerations  No raccoon's or Arnett sign  No dental trauma  Mandible is nontender  No otorrhea/rhinorrhea  Nose: Nose normal       Mouth/Throat:      Mouth: Mucous membranes are moist    Eyes:      Extraocular Movements: Extraocular movements intact  Pupils: Pupils are equal, round, and reactive to light  Neck:      Comments: No midline cervical tenderness  Cardiovascular:      Rate and Rhythm: Normal rate and regular rhythm  Pulses: Normal pulses  Heart sounds: Normal heart sounds  No murmur heard  No friction rub  No gallop  Pulmonary:      Effort: Pulmonary effort is normal  No respiratory distress  Breath sounds: Normal breath sounds  No stridor  No wheezing, rhonchi or rales  Chest:      Chest wall: No tenderness  Abdominal:      General: Bowel sounds are normal  There is no distension  Palpations: Abdomen is soft  Tenderness: There is no abdominal tenderness  There is no right CVA tenderness, left CVA tenderness, guarding or rebound  Musculoskeletal:         General: Tenderness present  No swelling or deformity  Cervical back: Normal range of motion and neck supple  No rigidity  No muscular tenderness  Comments: There is tenderness to the right hip  There is severe pain with any movement  Otherwise extremities are atraumatic  No thoracic or lumbar tenderness  Skin:     General: Skin is warm and dry  Capillary Refill: Capillary refill takes less than 2 seconds  Coloration: Skin is not pale  Neurological:      General: No focal deficit present  Mental Status: He is alert and oriented to person, place, and time     Psychiatric:         Mood and Affect: Mood normal          Behavior: Behavior normal          Vital Signs  ED Triage Vitals [03/25/23 0430]   Temperature Pulse Respirations Blood Pressure SpO2   98 8 °F (37 1 °C) 63 18 141/63 98 %      Temp Source Heart Rate Source Patient Position - Orthostatic VS BP Location FiO2 (%)   Oral Monitor -- -- --      Pain Score       9           Vitals:    03/25/23 0430   BP: 141/63   Pulse: 63         Visual Acuity      ED Medications  Medications   morphine injection 4 mg (has no administration in time range)       Diagnostic Studies  Results Reviewed     Procedure Component Value Units Date/Time    CBC and differential [[id number]]     Lab Status: No result Specimen: Blood     Comprehensive metabolic panel [[id number]]     Lab Status: No result Specimen: Blood                  XR hip/pelv 2-3 vws right    (Results Pending)   XR chest 1 view portable    (Results Pending)              Procedures  ECG 12 Lead Documentation Only    Date/Time: 3/25/2023 5:01 AM  Performed by: Aicha Bailey MD  Authorized by: Aicha Bailey MD     ECG reviewed by me, the ED Provider: yes    Patient location:  ED  Comments:      Normal sinus rhythm  Right atrial enlargement  Incomplete right bundle branch block  Possible lateral infarct, age undetermined  Possible inferior infarct, age undetermined  T wave abnormality anteriorly  No STEMI  Baseline artifact is present  Abnormal EKG  ED Course                               SBIRT 20yo+    Flowsheet Row Most Recent Value   SBIRT (23 yo +)    In order to provide better care to our patients, we are screening all of our patients for alcohol and drug use  Would it be okay to ask you these screening questions? No Filed at: 03/25/2023 5472                    Medical Decision Making  Imaging shows a right hip fracture, intertrochanteric  History and physical examination do not suggest any other significant injuries  This was a mechanical fall  Nothing in the history suggests a medical fall  Consulted with orthopedics on-call  They are checking to see if patient can be repaired here or will patient needs transfer  Amount and/or Complexity of Data Reviewed  Labs: ordered  Decision-making details documented in ED Course  Radiology: ordered and independent interpretation performed  Decision-making details documented in ED Course  ECG/medicine tests: ordered and independent interpretation performed  Decision-making details documented in ED Course  Discussion of management or test interpretation with external provider(s): Orthopedics    Risk  Prescription drug management  Parenteral controlled substances  Decision regarding hospitalization  Emergency major surgery  Disposition  Final diagnoses:   None     ED Disposition     None      Follow-up Information    None         Patient's Medications   Discharge Prescriptions    No medications on file       No discharge procedures on file      PDMP Review     None          ED Provider  Electronically Signed by           Jb Griffiths MD  03/25/23 2020

## 2023-03-25 NOTE — CONSULTS
Consultation - Cardiology   Zaira Judd 65 y o  male MRN: [id number]  Unit/Bed#: ED 10 Encounter: [id number]    Assessment/Plan     Assessment:  1  Mechanical fall sustaining right displaced proximal femoral fracture  2  Hypertension  3  Schizoaffective disorder  4  Presurgical screening      Plan:  Patient has been admitted to the hospitalist service  1  Continue his home medications    2  Patient denies any chest pain or changes in his ability to perform activities    3  We will obtain 2D echocardiogram to evaluate cardiac function, structure wall motion    Patient is at low to intermediate risk for perioperative complications secondary to his history for hypertension  He appears to be in optimal health and may proceed with surgery as scheduled for this afternoon  Thank you for this consult we will continue to follow the patient with you      History of Present Illness   Physician Requesting Consult: Kate Callahan MD  Reason for Consult / Principal Problem: Mechanical fall with right displaced proximal femur fracture  Presurgical risk stratification      HPI: Zaira Judd is a 65y o  year old male who presented early this morning to the emergency room after experiencing a mechanical fall and landing on his right side  He states he was getting up to use the bathroom when he missed his walker and fell on his right hip  He was complaining of severe right hip pain on presentation to the emergency room  He denied any loss of consciousness neck or back pain or hitting his head  CT of the pelvis noted acute mildly displaced right proximal femur fracture  Patient is being admitted for surgical therapy  Patient has history for hypertension, schizoaffective disorder and anemia  He resides at [facility] and [address]  He denies any chest pressure, chest pain or palpitations          Consult to cardiology  Consult performed by: JEAN Martins  Consult ordered by: Cornelio Multani MD          Review of Systems   Constitutional: Negative  Negative for activity change, appetite change, fatigue and fever  HENT: Negative for congestion, ear pain, rhinorrhea and tinnitus  Eyes: Negative  Negative for photophobia and visual disturbance  Respiratory: Negative  Negative for chest tightness and shortness of breath  Cardiovascular: Negative  Negative for chest pain, palpitations and leg swelling  Gastrointestinal: Negative  Negative for abdominal distention, diarrhea, nausea and vomiting  Endocrine: Negative  Negative for polydipsia, polyphagia and polyuria  Genitourinary: Negative  Negative for difficulty urinating  Musculoskeletal: Positive for gait problem  Negative for arthralgias  Skin: Negative  Negative for color change and rash  Neurological: Negative for dizziness, speech difficulty, weakness and light-headedness  Hematological: Negative  Psychiatric/Behavioral: Negative  Historical Information   Past Medical History:   Diagnosis Date   • Hypertension    • Schizophrenia ([facility] [address] )      Past Surgical History:   Procedure Laterality Date   • CHOLECYSTECTOMY     • TONSILECTOMY AND ADNOIDECTOMY       Social History     Substance and Sexual Activity   Alcohol Use None     Social History     Substance and Sexual Activity   Drug Use Not on file     E-Cigarette/Vaping     E-Cigarette/Vaping Substances     Social History     Tobacco Use   Smoking Status Not on file   Smokeless Tobacco Not on file     Family History: No family history on file  Meds/Allergies   all current active meds have been reviewed, current meds:   Current Facility-Administered Medications   Medication Dose Route Frequency   • tranexamic Acid 1,000 mg in sodium chloride 0 9 % 500 mL IVPB  1,000 mg Intravenous Once    and PTA meds:   Prior to Admission Medications   Prescriptions Last Dose Informant Patient Reported? Taking?    amLODIPine (NORVASC) 5 mg tablet 3/24/2023  Yes Yes   Sig: Take 5 mg by mouth daily   clonazePAM (KlonoPIN) 0 25 MG disintegrating tablet 3/24/2023  Yes Yes   Sig: Take 0 25 mg by mouth daily   divalproex sodium (DEPAKOTE) 125 mg EC tablet 3/24/2023  Yes Yes   Sig: Take 125 mg by mouth every 8 (eight) hours   losartan (COZAAR) 50 mg tablet 3/24/2023  Yes Yes   Sig: Take 50 mg by mouth daily   metoprolol tartrate (LOPRESSOR) 50 mg tablet 3/24/2023  Yes Yes   Sig: Take 50 mg by mouth every 12 (twelve) hours      Facility-Administered Medications: None     No Known Allergies    Objective   Vitals: Blood pressure 145/67, pulse 62, temperature 98 8 °F (37 1 °C), temperature source Oral, resp  rate 18, weight 67 1 kg (147 lb 14 9 oz), SpO2 99 %  Orthostatic Blood Pressures    Flowsheet Row Most Recent Value   Blood Pressure 145/67 filed at 03/25/2023 0500   Patient Position - Orthostatic VS Lying filed at 03/25/2023 0500            Intake/Output Summary (Last 24 hours) at 3/25/2023 1014  Last data filed at 3/25/2023 0708  Gross per 24 hour   Intake 100 ml   Output --   Net 100 ml       Invasive Devices     Peripheral Intravenous Line  Duration           Peripheral IV 03/25/23 Dorsal (posterior); Right Forearm <1 day    Peripheral IV 03/25/23 Right Antecubital <1 day                Physical Exam  Vitals and nursing note reviewed  Constitutional:       General: He is not in acute distress  Appearance: Normal appearance  He is normal weight  HENT:      Right Ear: External ear normal       Left Ear: External ear normal    Eyes:      General: No scleral icterus  Right eye: No discharge  Left eye: No discharge  Cardiovascular:      Rate and Rhythm: Normal rate and regular rhythm  Pulses: Normal pulses  Heart sounds: Normal heart sounds  Pulmonary:      Effort: Pulmonary effort is normal  No accessory muscle usage or respiratory distress  Breath sounds: Examination of the right-lower field reveals decreased breath sounds   Examination of the left-lower field reveals decreased breath sounds  Decreased breath sounds present  Comments: Coarse breath sounds  Abdominal:      General: Bowel sounds are normal  There is no distension  Palpations: Abdomen is soft  Musculoskeletal:      Right lower leg: No edema  Left lower leg: No edema  Skin:     General: Skin is warm and dry  Capillary Refill: Capillary refill takes less than 2 seconds  Neurological:      General: No focal deficit present  Mental Status: He is alert and oriented to person, place, and time  Mental status is at baseline  Psychiatric:         Mood and Affect: Mood normal          Lab Results:   I have personally reviewed pertinent lab results  CBC with diff:   Results from last 7 days   Lab Units 03/25/23  0451   WBC Thousand/uL 12 59*   RBC Million/uL 3 65*   HEMOGLOBIN g/dL 9 5*   HEMATOCRIT % 30 4*   MCV fL 83   MCH pg 26 0*   MCHC g/dL 31 3*   RDW % 16 7*   MPV fL 9 8   PLATELETS Thousands/uL 369     CMP:   Results from last 7 days   Lab Units 03/25/23  0451   SODIUM mmol/L 138   CHLORIDE mmol/L 104   CO2 mmol/L 26   BUN mg/dL 14   CREATININE mg/dL 0 64   CALCIUM mg/dL 8 8   AST U/L 12*   ALT U/L 10   ALK PHOS U/L 89   EGFR ml/min/1 73sq m 93     HS Troponin: No results found for: HSTNI, HSTNI0, HSTNI2, HSTNI4  BNP:   Results from last 7 days   Lab Units 03/25/23  0451   POTASSIUM mmol/L 4 2   CHLORIDE mmol/L 104   CO2 mmol/L 26   BUN mg/dL 14   CREATININE mg/dL 0 64   CALCIUM mg/dL 8 8   EGFR ml/min/1 73sq m 93     Imaging: I have personally reviewed pertinent reports  EKG: Sinus rhythm with incomplete right bundle branch block    Poor tracing secondary to artifact  VTE Prophylaxis: Sequential compression device Sylvia Frey)     Code Status: No Order  Advance Directive and Living Will:      Power of :    POLST:      Merrill  Cardiology

## 2023-03-25 NOTE — H&P
History and Physical - [age] [facility]    Patient Information: Bianca Dotson 65 y o  male MRN: [id number]  Unit/Bed#: ED 10 Encounter: [id number]  Admitting Physician: Pepper Gleason MD  PCP: Casper Nielson DO  Date of Admission:  03/25/23        Hospital Problem List:     Principal Problem:    Closed right hip fracture [facility])  Active Problems:    Hypertension    Schizophrenia ([address] )    Anemia    Leukocytosis    Abnormal CT scan      Assessment/Plan:    * Closed right hip fracture [facility])  Assessment & Plan  Presented after mechanical fall and right hip pain  Noted to have acute mildly displaced comminuted intertrochanteric fracture of the proximal right femur  The right hip joint space is intact  No free air or free fluid within the pelvis  Evaluated by orthopedic, recommended surgical patient today and cardiology evaluation  · N p o   · IV fluid  · Type and screen  · Follow-up 2D echo  · Pain control  · Follow-up labs  · DVT prophylaxis postoperatively      Hypertension  Assessment & Plan  Continue amlodipine and metoprolol  Hold losartan for now, will resume postoperatively with holding parameters    Abnormal CT scan  Assessment & Plan  CT scan of the pelvis incidentally noted a 17 x 14 mm soft tissue nodule noted adjacent to the rectus sigmoid junction, to the right of midline Which may represent enlarged lymph node  Tissue sampling and/or PET/CT was recommended for further evaluation  Discussed findings with the patient, we will recommend follow-up on discharge    Leukocytosis  Assessment & Plan  Likely reactive  Monitor    Anemia  Assessment & Plan  Noted to have hemoglobin of 9 5 g/dL, normocytic  Hemoglobin was 11 2 g/dL on 5/22 on care everywhere  Patient with history of EGD in 7/21 with evidence of erosive gastritis and polyps were removed  Patient also reports that he had colonoscopy previously but not sure how long ago    · Iron studies, B12, folate  · Type and screen  · We will trend postoperatively    Schizophrenia ([facility] [address] )  Assessment & Plan  Currently appears to be at baseline  Continue Klonopin, Depakote          VTE Prophylaxis: Enoxaparin (Lovenox)  / sequential compression device   Code Status: Full code    Anticipated Length of Stay:  Patient will be admitted on an Inpatient basis with an anticipated length of stay of  > 2 midnights  Justification for Hospital Stay: Right hip fracture    Total Time for Visit, including Counseling / Coordination of Care: 45 minutes  Greater than 50% of this total time spent on direct patient counseling and coordination of care  Coordinate with cardiology  Patient reported that her new contact case his sister but he has not been able to get in touch with her many months    Chief Complaint:     Fall (Pt reports r hip pain after fall  Pt was getting up to use the restroom and went to grab walker and fell  Denies hitting head, denies thinners, denies loc)    History of Present Illness:    Whit De Los Santos is a 65 y o  male with history of hypertension, schizoaffective disorder history of testicular cancer status post treatment who presents to ED with right hip pain after mechanical fall  Patient reported that he had some left foot discomfort few days ago and was advised to use walker for ambulation  Last night patient woke up to use the bathroom but missed the walker and fell on his right side subsequently had the right hip pain and was not able to get up  Patient was brought to ED for further evaluation  Patient denies any chest pain, shortness of breath, dizziness, palpitation, denies any prior perioperative complications  Patient also denies any history of bleeding, dark stool or melena  In ED patient was afebrile with stable vital signs  Labs revealed mild leukocytosis and anemia  CT scan of the pelvis revealed acute mildly displaced comminuted intratrochanteric fracture of the proximal right femur  No free fluid or air noted  Patient was also noted to have a 17 mm soft tissue nodule in the deep right pelvis adjacent to right sigmoid colon  Review of Systems:    Review of Systems   Constitutional: Negative for chills and fever  HENT: Negative for congestion and sore throat  Respiratory: Negative for cough and shortness of breath  Cardiovascular: Negative for chest pain and palpitations  Gastrointestinal: Negative for abdominal pain, diarrhea, nausea and vomiting  Genitourinary: Negative for dysuria and hematuria  Musculoskeletal: Positive for arthralgias and gait problem  Skin: Negative for color change and rash  Neurological: Negative for dizziness, seizures, syncope, weakness and light-headedness  Psychiatric/Behavioral: Negative for behavioral problems  Past Medical and Surgical History:     Past Medical History:   Diagnosis Date   • Hypertension    • Schizophrenia [facility])        Past Surgical History:   Procedure Laterality Date   • CHOLECYSTECTOMY     • TONSILECTOMY AND ADNOIDECTOMY         Meds/Allergies:    PTA meds:   Prior to Admission Medications   Prescriptions Last Dose Informant Patient Reported? Taking?    amLODIPine (NORVASC) 5 mg tablet   Yes Yes   Sig: Take 5 mg by mouth daily   clonazePAM (KlonoPIN) 0 25 MG disintegrating tablet   Yes Yes   Sig: Take 0 25 mg by mouth daily   divalproex sodium (DEPAKOTE) 125 mg EC tablet   Yes Yes   Sig: Take 125 mg by mouth every 8 (eight) hours   losartan (COZAAR) 50 mg tablet   Yes Yes   Sig: Take 50 mg by mouth daily   metoprolol tartrate (LOPRESSOR) 50 mg tablet   Yes Yes   Sig: Take 50 mg by mouth every 12 (twelve) hours      Facility-Administered Medications: None       Allergies: No Known Allergies  History:     Marital Status: Single     Substance Use History:   Social History     Substance and Sexual Activity   Alcohol Use Not on file     Social History     Tobacco Use   Smoking Status Not on file   Smokeless Tobacco Not on file     Social History Substance and Sexual Activity   Drug Use Not on file       Family History:    No family history on file  Physical Exam:     Vitals:   Blood Pressure: 145/67 (03/25/23 0500)  Pulse: 62 (03/25/23 0500)  Temperature: 98 8 °F (37 1 °C) (03/25/23 0430)  Temp Source: Oral (03/25/23 0430)  Respirations: 18 (03/25/23 0500)  Weight - Scale: 67 1 kg (147 lb 14 9 oz) (03/25/23 0430)  SpO2: 99 % (03/25/23 0500)    Physical Exam  Constitutional:       General: He is not in acute distress  HENT:      Head: Normocephalic and atraumatic  Eyes:      Pupils: Pupils are equal, round, and reactive to light  Cardiovascular:      Rate and Rhythm: Normal rate  Pulmonary:      Effort: Pulmonary effort is normal  No respiratory distress  Breath sounds: No wheezing, rhonchi or rales  Chest:      Chest wall: No tenderness  Abdominal:      General: Bowel sounds are normal  There is no distension  Palpations: Abdomen is soft  Tenderness: There is no abdominal tenderness  There is no guarding or rebound  Musculoskeletal:         General: Tenderness (Right hip) and deformity present  No swelling  Cervical back: Neck supple  Right lower leg: No edema  Left lower leg: No edema  Comments: Moving left foot without any difficulties  Distal neurovascular intact   Skin:     General: Skin is warm and dry  Findings: No rash  Neurological:      General: No focal deficit present  Mental Status: He is alert and oriented to person, place, and time  Mental status is at baseline  Cranial Nerves: No cranial nerve deficit  Comments: Distal neurovascular intact   Psychiatric:         Mood and Affect: Mood normal                  Lab Results: I have personally reviewed pertinent reports        Results from last 7 days   Lab Units 03/25/23  0451   WBC Thousand/uL 12 59*   HEMOGLOBIN g/dL 9 5*   HEMATOCRIT % 30 4*   PLATELETS Thousands/uL 369   NEUTROS PCT % 81*   LYMPHS PCT % 10*   MONOS PCT % 7   EOS PCT % 1     Results from last 7 days   Lab Units 03/25/23  0451   POTASSIUM mmol/L 4 2   CHLORIDE mmol/L 104   CO2 mmol/L 26   BUN mg/dL 14   CREATININE mg/dL 0 64   CALCIUM mg/dL 8 8   ALK PHOS U/L 89   ALT U/L 10   AST U/L 12*           Imaging: I have personally reviewed pertinent reports  CT pelvis wo contrast    Result Date: 3/25/2023  Narrative: CT PELVIS WITHOUT IV CONTRAST INDICATION:   trauma  COMPARISON:  None  TECHNIQUE: CT examination of the pelvis was performed without intravenous contrast  Multiplanar 2D reformatted images were created from the source data  Radiation dose length product (DLP) for this visit:  700 mGy-cm   This examination, like all CT scans performed in the [facility], was performed utilizing techniques to minimize radiation dose exposure, including the use of iterative reconstruction and automated exposure control  FINDINGS: VISUALIZED KIDNEYS/URETERS:  No significant abnormality identified in the partially imaged kidneys and ureters  REPRODUCTIVE ORGANS:  Unremarkable for patient's age  URINARY BLADDER:  Unremarkable  APPENDIX:  No findings to suggest appendicitis  VISUALIZED BOWEL:  Unremarkable  ABDOMINOPELVIC CAVITY:  No ascites or free intraperitoneal air  There is a 17 x 14 mm soft tissue nodule noted adjacent to the rectus sigmoid junction, to the right of midline (series 3, image 51)  VISUALIZED VESSELS:  Unremarkable for patient's age  ABDOMINOPELVIC WALL/INGUINAL REGIONS: Unremarkable  OSSEOUS STRUCTURES:  There is an acute mildly displaced comminuted intertrochanteric fracture of the proximal right femur with the fracture extending to the proximal femoral diaphysis  The right hip joint space is intact  Impression: Acute mildly displaced comminuted intertrochanteric fracture of the proximal right femur  The right hip joint space is intact  No free air or free fluid within the pelvis   17 mm soft tissue nodule within the deep right "pelvis, adjacent to the right through sigmoid colon, which may represent an enlarged lymph node  No prior studies are available for comparison  Tissue sampling and/or PET/CT scan could be performed for further evaluation  Considerations related to the patient's age and/or comorbidities may be used to alter these recommendations  Workstation performed: [id number]       CT pelvis wo contrast   Final Result      Acute mildly displaced comminuted intertrochanteric fracture of the proximal right femur  The right hip joint space is intact  No free air or free fluid within the pelvis  17 mm soft tissue nodule within the deep right pelvis, adjacent to the right through sigmoid colon, which may represent an enlarged lymph node  No prior studies are available for comparison  Tissue sampling and/or PET/CT scan could be performed for    further evaluation  Considerations related to the patient's age and/or comorbidities may be used to alter these recommendations  Workstation performed: [id number]         XR hip/pelv 2-3 vws right   ED Interpretation   Hip fracture  No dislocation  XR chest 1 view   ED Interpretation   No infiltrates no pneumothorax  EKG, Pathology, and Other Studies Reviewed on Admission:   · EKG- normal sinus rhythm at 62 bpm, incomplete RBBB, QTc 436    Allscripts/EPIC Records Reviewed: Yes     ** Please Note: \"This note has been constructed using a voice recognition system  Therefore there may be syntax, spelling, and/or grammatical errors  Please call if you have any questions   \"**    "

## 2023-03-26 PROBLEM — R94.31 ABNORMAL EKG: Status: ACTIVE | Noted: 2023-03-26

## 2023-03-26 LAB
ANION GAP SERPL CALCULATED.3IONS-SCNC: 8 MMOL/L (ref 4–13)
BUN SERPL-MCNC: 11 MG/DL (ref 5–25)
CALCIUM SERPL-MCNC: 7.7 MG/DL (ref 8.4–10.2)
CHLORIDE SERPL-SCNC: 104 MMOL/L (ref 96–108)
CO2 SERPL-SCNC: 24 MMOL/L (ref 21–32)
CREAT SERPL-MCNC: 0.53 MG/DL (ref 0.6–1.3)
ERYTHROCYTE [DISTWIDTH] IN BLOOD BY AUTOMATED COUNT: 16.7 % (ref 11.6–15.1)
GFR SERPL CREATININE-BSD FRML MDRD: 101 ML/MIN/1.73SQ M
GLUCOSE SERPL-MCNC: 118 MG/DL (ref 65–140)
HCT VFR BLD AUTO: 23.6 % (ref 36.5–49.3)
HGB BLD-MCNC: 7.5 G/DL (ref 12–17)
MAGNESIUM SERPL-MCNC: 1.6 MG/DL (ref 1.9–2.7)
MCH RBC QN AUTO: 26.1 PG (ref 26.8–34.3)
MCHC RBC AUTO-ENTMCNC: 31.8 G/DL (ref 31.4–37.4)
MCV RBC AUTO: 82 FL (ref 82–98)
MRSA NOSE QL CULT: NORMAL
PLATELET # BLD AUTO: 304 THOUSANDS/UL (ref 149–390)
PMV BLD AUTO: 10 FL (ref 8.9–12.7)
POTASSIUM SERPL-SCNC: 3.8 MMOL/L (ref 3.5–5.3)
RBC # BLD AUTO: 2.87 MILLION/UL (ref 3.88–5.62)
SODIUM SERPL-SCNC: 136 MMOL/L (ref 135–147)
WBC # BLD AUTO: 11.65 THOUSAND/UL (ref 4.31–10.16)

## 2023-03-26 RX ORDER — MAGNESIUM SULFATE HEPTAHYDRATE 40 MG/ML
2 INJECTION, SOLUTION INTRAVENOUS ONCE
Status: COMPLETED | OUTPATIENT
Start: 2023-03-26 | End: 2023-03-26

## 2023-03-26 RX ORDER — ASPIRIN 81 MG/1
81 TABLET, CHEWABLE ORAL DAILY
Status: DISCONTINUED | OUTPATIENT
Start: 2023-03-27 | End: 2023-03-27 | Stop reason: HOSPADM

## 2023-03-26 RX ORDER — PANTOPRAZOLE SODIUM 40 MG/1
40 TABLET, DELAYED RELEASE ORAL
Status: DISCONTINUED | OUTPATIENT
Start: 2023-03-26 | End: 2023-03-27 | Stop reason: HOSPADM

## 2023-03-26 RX ADMIN — DOCUSATE SODIUM 100 MG: 100 CAPSULE, LIQUID FILLED ORAL at 09:56

## 2023-03-26 RX ADMIN — CLONAZEPAM 0.25 MG: 0.5 TABLET ORAL at 09:57

## 2023-03-26 RX ADMIN — HYDROMORPHONE HYDROCHLORIDE 0.2 MG: 0.2 INJECTION, SOLUTION INTRAMUSCULAR; INTRAVENOUS; SUBCUTANEOUS at 09:04

## 2023-03-26 RX ADMIN — METOPROLOL TARTRATE 50 MG: 50 TABLET, FILM COATED ORAL at 21:19

## 2023-03-26 RX ADMIN — DIVALPROEX SODIUM 125 MG: 125 TABLET, DELAYED RELEASE ORAL at 21:16

## 2023-03-26 RX ADMIN — MAGNESIUM SULFATE HEPTAHYDRATE 2 G: 40 INJECTION, SOLUTION INTRAVENOUS at 09:56

## 2023-03-26 RX ADMIN — OXYCODONE HYDROCHLORIDE 5 MG: 5 TABLET ORAL at 21:20

## 2023-03-26 RX ADMIN — DIVALPROEX SODIUM 125 MG: 125 TABLET, DELAYED RELEASE ORAL at 13:09

## 2023-03-26 RX ADMIN — METOPROLOL TARTRATE 50 MG: 50 TABLET, FILM COATED ORAL at 09:56

## 2023-03-26 RX ADMIN — ASPIRIN 81 MG CHEWABLE TABLET 81 MG: 81 TABLET CHEWABLE at 09:56

## 2023-03-26 RX ADMIN — AMLODIPINE BESYLATE 5 MG: 5 TABLET ORAL at 09:56

## 2023-03-26 RX ADMIN — CEFAZOLIN SODIUM 1000 MG: 1 SOLUTION INTRAVENOUS at 06:13

## 2023-03-26 RX ADMIN — DIVALPROEX SODIUM 125 MG: 125 TABLET, DELAYED RELEASE ORAL at 06:14

## 2023-03-26 RX ADMIN — CEFAZOLIN SODIUM 1000 MG: 1 SOLUTION INTRAVENOUS at 13:09

## 2023-03-26 RX ADMIN — PANTOPRAZOLE SODIUM 40 MG: 40 TABLET, DELAYED RELEASE ORAL at 10:05

## 2023-03-26 RX ADMIN — ENOXAPARIN SODIUM 40 MG: 40 INJECTION SUBCUTANEOUS at 09:56

## 2023-03-26 RX ADMIN — DOCUSATE SODIUM 100 MG: 100 CAPSULE, LIQUID FILLED ORAL at 16:36

## 2023-03-26 NOTE — PLAN OF CARE
Problem: Potential for Falls  Goal: Patient will remain free of falls  Description: INTERVENTIONS:  - Educate patient/family on patient safety including physical limitations  - Instruct patient to call for assistance with activity   - Consult OT/PT to assist with strengthening/mobility   - Keep Call bell within reach  - Keep bed low and locked with side rails adjusted as appropriate  - Keep care items and personal belongings within reach  - Initiate and maintain comfort rounds  - Make Fall Risk Sign visible to staff  - Apply yellow socks and bracelet for high fall risk patients  - Consider moving patient to room near nurses station  Outcome: Progressing     Problem: MOBILITY - ADULT  Goal: Maintain or return to baseline ADL function  Description: INTERVENTIONS:  -  Assess patient's ability to carry out ADLs; assess patient's baseline for ADL function and identify physical deficits which impact ability to perform ADLs (bathing, care of mouth/teeth, toileting, grooming, dressing, etc )  - Assess/evaluate cause of self-care deficits   - Assess range of motion  - Assess patient's mobility; develop plan if impaired  - Assess patient's need for assistive devices and provide as appropriate  - Encourage maximum independence but intervene and supervise when necessary  - Involve family in performance of ADLs  - Assess for home care needs following discharge   - Consider OT consult to assist with ADL evaluation and planning for discharge  - Provide patient education as appropriate  Outcome: Progressing  Goal: Maintains/Returns to pre admission functional level  Description: INTERVENTIONS:  - Perform BMAT or MOVE assessment daily    - Set and communicate daily mobility goal to care team and patient/family/caregiver     - Collaborate with rehabilitation services on mobility goals if consulted  - Out of bed for toileting  - Record patient progress and toleration of activity level   Outcome: Progressing     Problem: Prexisting or High Potential for Compromised Skin Integrity  Goal: Skin integrity is maintained or improved  Description: INTERVENTIONS:  - Identify patients at risk for skin breakdown  - Assess and monitor skin integrity  - Assess and monitor nutrition and hydration status  - Monitor labs   - Assess for incontinence   - Turn and reposition patient  - Assist with mobility/ambulation  - Relieve pressure over bony prominences  - Avoid friction and shearing  - Provide appropriate hygiene as needed including keeping skin clean and dry  - Evaluate need for skin moisturizer/barrier cream  - Collaborate with interdisciplinary team   - Patient/family teaching  - Consider wound care consult   Outcome: Progressing     Problem: MUSCULOSKELETAL - ADULT  Goal: Maintain or return mobility to safest level of function  Description: INTERVENTIONS:  - Assess patient's ability to carry out ADLs; assess patient's baseline for ADL function and identify physical deficits which impact ability to perform ADLs (bathing, care of mouth/teeth, toileting, grooming, dressing, etc )  - Assess/evaluate cause of self-care deficits   - Assess range of motion  - Assess patient's mobility  - Assess patient's need for assistive devices and provide as appropriate  - Encourage maximum independence but intervene and supervise when necessary  - Involve family in performance of ADLs  - Assess for home care needs following discharge   - Consider OT consult to assist with ADL evaluation and planning for discharge  - Provide patient education as appropriate  Outcome: Progressing  Goal: Maintain proper alignment of affected body part  Description: INTERVENTIONS:  - Support, maintain and protect limb and body alignment  - Provide patient/ family with appropriate education  Outcome: Progressing     Problem: PAIN - ADULT  Goal: Verbalizes/displays adequate comfort level or baseline comfort level  Description: Interventions:  - Encourage patient to monitor pain and request assistance  - Assess pain using appropriate pain scale  - Administer analgesics based on type and severity of pain and evaluate response  - Implement non-pharmacological measures as appropriate and evaluate response  - Consider cultural and social influences on pain and pain management  - Notify physician/advanced practitioner if interventions unsuccessful or patient reports new pain  Outcome: Progressing     Problem: SAFETY ADULT  Goal: Patient will remain free of falls  Description: INTERVENTIONS:  - Educate patient/family on patient safety including physical limitations  - Instruct patient to call for assistance with activity   - Consult OT/PT to assist with strengthening/mobility   - Keep Call bell within reach  - Keep bed low and locked with side rails adjusted as appropriate  - Keep care items and personal belongings within reach  - Initiate and maintain comfort rounds  - Make Fall Risk Sign visible to staff  - Apply yellow socks and bracelet for high fall risk patients  - Consider moving patient to room near nurses station  Outcome: Progressing

## 2023-03-26 NOTE — UTILIZATION REVIEW
Initial Clinical Review    Admission: Date/Time/Statement:   Admission Orders (From admission, onward)     Ordered        03/25/23 0808  INPATIENT ADMISSION  Once                      Orders Placed This Encounter   Procedures   • INPATIENT ADMISSION     Standing Status:   Standing     Number of Occurrences:   1     Order Specific Question:   Level of Care     Answer:   Med Surg [16]     Order Specific Question:   Estimated length of stay     Answer:   More than 2 Midnights     Order Specific Question:   Certification     Answer:   I certify that inpatient services are medically necessary for this patient for a duration of greater than two midnights  See H&P and MD Progress Notes for additional information about the patient's course of treatment  ED Arrival Information     Expected   -    Arrival   3/25/2023 04:28    Acuity   Emergent            Means of arrival   Ambulance    Escorted by   [facility]    Admission type   Emergency            Arrival complaint   FALL           Chief Complaint   Patient presents with   • Fall     Pt reports r hip pain after fall  Pt was getting up to use the restroom and went to grab walker and fell   Denies hitting head, denies thinners, denies loc       Initial Presentation: 65 y o  male ***    Date: ***   Day 2: ***    ED Triage Vitals   Temperature Pulse Respirations Blood Pressure SpO2   03/25/23 0430 03/25/23 0430 03/25/23 0430 03/25/23 0430 03/25/23 0430   98 8 °F (37 1 °C) 63 18 141/63 98 %      Temp Source Heart Rate Source Patient Position - Orthostatic VS BP Location FiO2 (%)   03/25/23 0430 03/25/23 0430 03/25/23 0500 03/25/23 0500 --   Oral Monitor Lying Right arm       Pain Score       03/25/23 0430       9          Wt Readings from Last 1 Encounters:   03/25/23 66 7 kg (147 lb)     Additional Vital Signs: ***  Pertinent Labs/Diagnostic Test Results:   XR hip/pelv 2-3 vws right if performed   Final Result by Jesús Gillespie MD (03/25 1514)      Fluoroscopic guidance provided for procedure guidance  Please refer to the separate procedure notes for additional details  Workstation performed: [id number]         CT pelvis wo contrast   Final Result by Mike Izquierdo MD (03/25 5840)      Acute mildly displaced comminuted intertrochanteric fracture of the proximal right femur  The right hip joint space is intact  No free air or free fluid within the pelvis  17 mm soft tissue nodule within the deep right pelvis, adjacent to the right through sigmoid colon, which may represent an enlarged lymph node  No prior studies are available for comparison  Tissue sampling and/or PET/CT scan could be performed for    further evaluation  Considerations related to the patient's age and/or comorbidities may be used to alter these recommendations  Workstation performed: [id number]         XR hip/pelv 2-3 vws right   ED Interpretation by Aicha Bailey MD (03/25 0542)   Hip fracture  No dislocation  Final Result by Amanuel Lozano MD (03/25 1153)      Impacted intertrochanteric fracture with displaced lesser trochanter  Workstation performed: [id number]         XR chest 1 view   ED Interpretation by Aicha Bailey MD (03/25 0542)   No infiltrates no pneumothorax  Final Result by Curry Zelaya MD (03/25 1153)      No acute cardiopulmonary disease                    Workstation performed: [id number]               Results from last 7 days   Lab Units 03/26/23  0542 03/25/23  0451   WBC Thousand/uL 11 65* 12 59*   HEMOGLOBIN g/dL 7 5* 9 5*   HEMATOCRIT % 23 6* 30 4*   PLATELETS Thousands/uL 304 369   NEUTROS ABS Thousands/µL  --  10 11*         Results from last 7 days   Lab Units 03/26/23  0542 03/25/23  0451   SODIUM mmol/L 136 138   POTASSIUM mmol/L 3 8 4 2   CHLORIDE mmol/L 104 104   CO2 mmol/L 24 26   ANION GAP mmol/L 8 8   BUN mg/dL 11 14   CREATININE mg/dL 0 53* 0 64   EGFR ml/min/1 73sq m 101 93   CALCIUM mg/dL 7 7* 8 8   MAGNESIUM mg/dL 1 6*  --      Results from last 7 days   Lab Units 03/25/23  0451   AST U/L 12*   ALT U/L 10   ALK PHOS U/L 89   TOTAL PROTEIN g/dL 7 1   ALBUMIN g/dL 3 2*   TOTAL BILIRUBIN mg/dL 0 29         Results from last 7 days   Lab Units 03/26/23  0542 03/25/23  0451   GLUCOSE RANDOM mg/dL 118 131             No results found for: BETA-HYDROXYBUTYRATE                           Results from last 7 days   Lab Units 03/25/23  1119   PROTIME seconds 15 0*   INR  1 17   PTT seconds 39*                             Results from last 7 days   Lab Units 03/25/23  0451   FERRITIN ng/mL 151                                                                       ED Treatment:   Medication Administration from 03/25/2023 0428 to 03/25/2023 1048       Date/Time Order Dose Route Action Action by Comments     03/25/2023 0448 EDT morphine injection 4 mg 4 mg Intravenous Given Judeen Riser, RN --     03/25/2023 7601 EDT morphine injection 4 mg 4 mg Intravenous Given Rose Coreas, RN --     03/25/2023 0708 EDT tranexamic Acid 670 mg in sodium chloride 0 9 % 100 mL IVPB Loading Dose 0 mg Intravenous Stopped Judeen Riser, RN --     03/25/2023 2274 EDT tranexamic Acid 670 mg in sodium chloride 0 9 % 100 mL IVPB Loading Dose 670 mg Intravenous New Bag Judeen Riser, RN --     03/25/2023 0708 EDT tranexamic Acid 1,000 mg in sodium chloride 0 9 % 500 mL IVPB 1,000 mg Intravenous New Bag Judeen Riser, RN --        Past Medical History:   Diagnosis Date   • Hypertension    • Schizophrenia ([place] [address] )      Present on Admission:  • Closed right hip fracture ([place] [address] )  • Hypertension  • Schizophrenia ([place] [address] )  • Anemia  • Leukocytosis  • Abnormal CT scan      Admitting Diagnosis: Hip fracture ([place] [address] ) [S72 009A]  Schizophrenia ([place] [address] ) [F20 9]  Hip pain [M25 559]  Anemia [D64 9]  Closed displaced intertrochanteric fracture of right femur, initial encounter ([place] [address] ) Padmini Minor  Age/Sex: 65 y o  male  Admission Orders:  Scheduled Medications:  amLODIPine, 5 mg, Oral, Daily  aspirin, 81 mg, Oral, BID  clonazePAM, 0 25 mg, Oral, Daily  divalproex sodium, 125 mg, Oral, Q8H REBECCA  docusate sodium, 100 mg, Oral, BID  enoxaparin, 40 mg, Subcutaneous, Q24H REBECCA  metoprolol tartrate, 50 mg, Oral, Q12H REBECCA  pantoprazole, 40 mg, Oral, Early Morning      Continuous IV Infusions:     PRN Meds:  acetaminophen, 650 mg, Oral, Q6H PRN  HYDROmorphone, 0 2 mg, Intravenous, Q4H PRN  ondansetron, 4 mg, Intravenous, Q6H PRN  oxyCODONE, 5 mg, Oral, Q4H PRN        IP CONSULT TO CARDIOLOGY    Network Utilization Review Department  ATTENTION: Please call with any questions or concerns to [phone] and carefully listen to the prompts so that you are directed to the right person  All voicemails are confidential   Delmus Goes all requests for admission clinical reviews, approved or denied determinations and any other requests to dedicated fax number below belonging to the campus where the patient is receiving treatment   List of dedicated fax numbers for the Facilities:  [address] DENIALS (Administrative/Medical Necessity) [phone]   [address] (Maternity/NICU/Pediatrics) [phone]   3 Leisa Alejandro [phone]   Jamie Mustafa 77 [phone]   [address] [address] Mark Altman 28 [phone]   1559 [facility] Hali Christie [facility] [phone] [facility] [phone]

## 2023-03-26 NOTE — PLAN OF CARE
Problem: Potential for Falls  Goal: Patient will remain free of falls  Description: INTERVENTIONS:  - Educate patient/family on patient safety including physical limitations  - Instruct patient to call for assistance with activity   - Consult OT/PT to assist with strengthening/mobility   - Keep Call bell within reach  - Keep bed low and locked with side rails adjusted as appropriate  - Keep care items and personal belongings within reach  - Initiate and maintain comfort rounds  - Make Fall Risk Sign visible to staff  - Offer Toileting every 2 Hours, in advance of need  - Initiate/Maintain bed alarm  - Apply yellow socks and bracelet for high fall risk patients  - Consider moving patient to room near nurses station  Outcome: Progressing     Problem: MOBILITY - ADULT  Goal: Maintain or return to baseline ADL function  Description: INTERVENTIONS:  -  Assess patient's ability to carry out ADLs; assess patient's baseline for ADL function and identify physical deficits which impact ability to perform ADLs (bathing, care of mouth/teeth, toileting, grooming, dressing, etc )  - Assess/evaluate cause of self-care deficits   - Assess range of motion  - Assess patient's mobility; develop plan if impaired  - Assess patient's need for assistive devices and provide as appropriate  - Encourage maximum independence but intervene and supervise when necessary  - Involve family in performance of ADLs  - Assess for home care needs following discharge   - Consider OT consult to assist with ADL evaluation and planning for discharge  - Provide patient education as appropriate  Outcome: Progressing  Goal: Maintains/Returns to pre admission functional level  Description: INTERVENTIONS:  - Perform BMAT or MOVE assessment daily    - Set and communicate daily mobility goal to care team and patient/family/caregiver  - Collaborate with rehabilitation services on mobility goals if consulted  - Perform Range of Motion 3 times a day    - Reposition patient every 2 hours    - Dangle patient 2 times a day  - Stand patient 3 times a day  - Ambulate patient 3 times a day  - Out of bed to chair 2 times a day   - Out of bed for meals 2 times a day  - Out of bed for toileting  - Record patient progress and toleration of activity level   Outcome: Progressing     Problem: Prexisting or High Potential for Compromised Skin Integrity  Goal: Skin integrity is maintained or improved  Description: INTERVENTIONS:  - Identify patients at risk for skin breakdown  - Assess and monitor skin integrity  - Assess and monitor nutrition and hydration status  - Monitor labs   - Assess for incontinence   - Turn and reposition patient  - Assist with mobility/ambulation  - Relieve pressure over bony prominences  - Avoid friction and shearing  - Provide appropriate hygiene as needed including keeping skin clean and dry  - Evaluate need for skin moisturizer/barrier cream  - Collaborate with interdisciplinary team   - Patient/family teaching  - Consider wound care consult   Outcome: Progressing     Problem: MUSCULOSKELETAL - ADULT  Goal: Maintain or return mobility to safest level of function  Description: INTERVENTIONS:  - Assess patient's ability to carry out ADLs; assess patient's baseline for ADL function and identify physical deficits which impact ability to perform ADLs (bathing, care of mouth/teeth, toileting, grooming, dressing, etc )  - Assess/evaluate cause of self-care deficits   - Assess range of motion  - Assess patient's mobility  - Assess patient's need for assistive devices and provide as appropriate  - Encourage maximum independence but intervene and supervise when necessary  - Involve family in performance of ADLs  - Assess for home care needs following discharge   - Consider OT consult to assist with ADL evaluation and planning for discharge  - Provide patient education as appropriate  Outcome: Progressing  Goal: Maintain proper alignment of affected body part  Description: INTERVENTIONS:  - Support, maintain and protect limb and body alignment  - Provide patient/ family with appropriate education  Outcome: Progressing     Problem: PAIN - ADULT  Goal: Verbalizes/displays adequate comfort level or baseline comfort level  Description: Interventions:  - Encourage patient to monitor pain and request assistance  - Assess pain using appropriate pain scale  - Administer analgesics based on type and severity of pain and evaluate response  - Implement non-pharmacological measures as appropriate and evaluate response  - Consider cultural and social influences on pain and pain management  - Notify physician/advanced practitioner if interventions unsuccessful or patient reports new pain  Outcome: Progressing     Problem: SAFETY ADULT  Goal: Patient will remain free of falls  Description: INTERVENTIONS:  - Educate patient/family on patient safety including physical limitations  - Instruct patient to call for assistance with activity   - Consult OT/PT to assist with strengthening/mobility   - Keep Call bell within reach  - Keep bed low and locked with side rails adjusted as appropriate  - Keep care items and personal belongings within reach  - Initiate and maintain comfort rounds  - Make Fall Risk Sign visible to staff  - Offer Toileting every 2 Hours, in advance of need  - Initiate/Maintain bed alarm  - Apply yellow socks and bracelet for high fall risk patients  - Consider moving patient to room near nurses station  Outcome: Progressing

## 2023-03-26 NOTE — PROGRESS NOTES
"Progress Note - Orthopedics   Madison Dominguez 65 y o  male MRN: [id number]  Unit/Bed#: [address] [place] [address] Encounter: [id number]        Subjective: Status post ORIF right intertrochanteric femur fx with short IM nail performed yesterday  Patient denies any pain this morning and notes good sensation of the right lower extremity  He denies any CP, SOB, or dizziness  No acute overnight events  Hgb  7 5 this morning  Vitals: Blood pressure 134/57, pulse 72, temperature 97 7 °F (36 5 °C), resp  rate 16, height 5' 9\" (1 753 m), weight 66 7 kg (147 lb), SpO2 99 %  ,Body mass index is 21 71 kg/m²  Intake/Output Summary (Last 24 hours) at 3/26/2023 0830  Last data filed at 3/25/2023 2300  Gross per 24 hour   Intake 100 ml   Output 450 ml   Net -350 ml       Invasive Devices     Peripheral Intravenous Line  Duration           Peripheral IV 03/25/23 Dorsal (posterior); Right Forearm 1 day                  Ortho Exam: Patient alert and oriented X3 in NAD lying comfortably in bed  RLE: Dressing CDI  Mild swelling hip  Compartments soft  No calf tenderness  Moves toes/ankle freely  2+ posterior tibial pulse  Sensation intact lateral femoral cutaneous, saphenous, sural, deep/superficial peroneal nerve distributions  Lab, Imaging and other studies: I have personally reviewed pertinent lab results    CBC:   Lab Results   Component Value Date    WBC 11 65 (H) 03/26/2023    HGB 7 5 (L) 03/26/2023    HCT 23 6 (L) 03/26/2023    MCV 82 03/26/2023     03/26/2023    MCH 26 1 (L) 03/26/2023    MCHC 31 8 03/26/2023    RDW 16 7 (H) 03/26/2023    MPV 10 0 03/26/2023    NRBC 0 03/25/2023     CMP:   Lab Results   Component Value Date     03/26/2023    CO2 24 03/26/2023    BUN 11 03/26/2023    CREATININE 0 53 (L) 03/26/2023    CALCIUM 7 7 (L) 03/26/2023    AST 12 (L) 03/25/2023    ALT 10 03/25/2023    ALKPHOS 89 03/25/2023    EGFR 101 03/26/2023     PT/INR:   Lab Results   Component Value Date    INR 1 17 03/25/2023 " Assessment:  POD #1 status post ORIF right intertrochanteric femur fx with short IM nail    Plan:  PT/OT  WBAT RLE  Lovenox for DVT prophylaxis  Post op abx  Analgesia prn  Will continue to monitor hgb  Patient did come in with baseline anemia and hgb down to 7 5 today  Will defer to primary team regarding possible transfusions  Ortho will continue to follow      Weight bearing: WBAT with assistance      VTE Pharmacologic Prophylaxis: Enoxaparin (Lovenox)  VTE Mechanical Prophylaxis: sequential compression device

## 2023-03-26 NOTE — PROGRESS NOTES
"Progress Note - Cardiology   [facility]     Piter Bass 65 y o  male MRN: [id number]  : 1945  Unit/Bed#: [address] [address] Encounter: [id number]    Assessment and Plan:   1  Mechanical fall sustaining right displaced proximal femoral fracture: Postop day 1 ORIF right intertrochanteric femoral fracture    -   Managed per Ortho, PT and OT    2  Hypertension: Continue Norvasc 5 mg once a day    -   Continue Lopressor 50 mg twice a day    3  Schizoaffective disorder: Continue his home medications    Patient appears to be stable from a cardiac standpoint  We will sign off at this time  Please do not hesitate to contact us again if needed during this admission  Subjective / Objective:   Patient seen and examined  He is in good spirits this morning does not offer any complaints  States pain in his right extremity well managed and he only has discomfort if he tries to move it  Discussed with him to ask for pain medication prior to physical therapy today  Vitals: Blood pressure 134/57, pulse 72, temperature 97 7 °F (36 5 °C), resp  rate 16, height 5' 9\" (1 753 m), weight 66 7 kg (147 lb), SpO2 99 %  Vitals:    23 0430 23 1002   Weight: 67 1 kg (147 lb 14 9 oz) 66 7 kg (147 lb)     Body mass index is 21 71 kg/m²  BP Readings from Last 3 Encounters:   23 134/57     Orthostatic Blood Pressures    Flowsheet Row Most Recent Value   Blood Pressure 134/57 filed at 2023 4891   Patient Position - Orthostatic VS Lying filed at 2023 0328        I/O        0701   0700  0701   0700  0701   0700    I V  (mL/kg)  100 (1 5)     IV Piggyback  100     Total Intake(mL/kg)  200 (3)     Urine (mL/kg/hr)  450 (0 3)     Total Output  450     Net  -250                Invasive Devices     Peripheral Intravenous Line  Duration           Peripheral IV 23 Dorsal (posterior); Right Forearm 1 day                  Intake/Output Summary (Last " hours) at 3/26/2023 0857  Last data filed at 3/25/2023 2300  Gross per 24 hour   Intake 100 ml   Output 450 ml   Net -350 ml         Physical Exam:   Physical Exam  Vitals and nursing note reviewed  Constitutional:       General: He is not in acute distress  Appearance: Normal appearance  He is normal weight  HENT:      Right Ear: External ear normal       Left Ear: External ear normal       Nose: Nose normal    Eyes:      General: No scleral icterus  Right eye: No discharge  Left eye: No discharge  Cardiovascular:      Rate and Rhythm: Normal rate and regular rhythm  Pulses: Normal pulses  Pulmonary:      Effort: Pulmonary effort is normal  No respiratory distress  Breath sounds: Normal breath sounds  No wheezing, rhonchi or rales  Abdominal:      General: Bowel sounds are normal  There is no distension  Palpations: Abdomen is soft  Musculoskeletal:      Right lower leg: No edema  Left lower leg: No edema  Skin:     General: Skin is warm and dry  Capillary Refill: Capillary refill takes less than 2 seconds  Neurological:      General: No focal deficit present  Mental Status: He is alert and oriented to person, place, and time  Mental status is at baseline     Psychiatric:         Mood and Affect: Mood normal             Medications/ Allergies:     Current Facility-Administered Medications   Medication Dose Route Frequency Provider Last Rate   • acetaminophen  650 mg Oral Q6H PRN Toni Vogel MD     • amLODIPine  5 mg Oral Daily Toni Vogel MD     • aspirin  81 mg Oral BID Toni Vogel MD     • cefazolin  1,000 mg Intravenous Q8H Toni Vogel MD 1,000 mg (03/26/23 8454)   • clonazePAM  0 25 mg Oral Daily Toni Vogel MD     • divalproex sodium  125 mg Oral [facility] Toni Vogel MD     • docusate sodium  100 mg Oral BID Toni Vogel MD     • enoxaparin  40 mg Subcutaneous Q24H [address] Farnaz Villalobos PA-C     • HYDROmorphone  0 2 mg Intravenous Q4H PRN Shanae Wray MD     • magnesium sulfate  2 g Intravenous Once Neal Brittle, MD     • metoprolol tartrate  50 mg Oral Q12H [address] Shanae Wray MD     • ondansetron  4 mg Intravenous Q6H PRN Shanae Wray MD     • oxyCODONE  5 mg Oral Q4H PRN Judy Garcia PA-C     • pantoprazole  40 mg Oral Early Morning Neal Brittle, MD     • sodium chloride  100 mL/hr Intravenous Continuous Shanae Wray  mL/hr (03/25/23 2222)     acetaminophen, 650 mg, Q6H PRN  HYDROmorphone, 0 2 mg, Q4H PRN  ondansetron, 4 mg, Q6H PRN  oxyCODONE, 5 mg, Q4H PRN      No Known Allergies    VTE Pharmacologic Prophylaxis:   Sequential compression device (Venodyne)     Labs:   CBC with diff:  Results from last 7 days   Lab Units 03/26/23  0542 03/25/23  0451   WBC Thousand/uL 11 65* 12 59*   HEMOGLOBIN g/dL 7 5* 9 5*   HEMATOCRIT % 23 6* 30 4*   MCV fL 82 83   PLATELETS Thousands/uL 304 369   MCH pg 26 1* 26 0*   MCHC g/dL 31 8 31 3*   RDW % 16 7* 16 7*   MPV fL 10 0 9 8   NRBC AUTO /100 WBCs  --  0     CMP:  Results from last 7 days   Lab Units 03/26/23  0542 03/25/23  0451   SODIUM mmol/L 136 138   POTASSIUM mmol/L 3 8 4 2   CHLORIDE mmol/L 104 104   CO2 mmol/L 24 26   ANION GAP mmol/L 8 8   BUN mg/dL 11 14   CREATININE mg/dL 0 53* 0 64   CALCIUM mg/dL 7 7* 8 8   AST U/L  --  12*   ALT U/L  --  10   ALK PHOS U/L  --  89   TOTAL PROTEIN g/dL  --  7 1   ALBUMIN g/dL  --  3 2*   TOTAL BILIRUBIN mg/dL  --  0 29   EGFR ml/min/1 73sq m 101 93     Magnesium:  Results from last 7 days   Lab Units 03/26/23  0542   MAGNESIUM mg/dL 1 6*     Coags:  Results from last 7 days   Lab Units 03/25/23  1119   PTT seconds 39*   INR  1 17       Imaging & Testing   I have personally reviewed pertinent reports  XR hip/pelv 2-3 vws right if performed    Result Date: 3/25/2023  Narrative: C-ARM - right hip INDICATION: T14  8XXA: Other injury of unspecified body region, initial encounter  Procedure guidance   COMPARISON:  Right hip radiographs 3/25/2023 TECHNIQUE: FLUOROSCOPY TIME:   56 0 SECONDS 3 FLUOROSCOPIC IMAGES FINDINGS: Fluoroscopic guidance provided for procedure guidance  Osseous and soft tissue detail limited by technique  Impression: Fluoroscopic guidance provided for procedure guidance  Please refer to the separate procedure notes for additional details  Workstation performed: [id number]     XR hip/pelv 2-3 vws right    Result Date: 3/25/2023  Narrative: RIGHT HIP INDICATION:   trauma  COMPARISON:  None VIEWS:  XR HIP/PELV 2-3 VWS RIGHT W PELVIS IF PERFORMED FINDINGS: Intertrochanteric fracture with proximal migration of shaft  Displaced lesser trochanteric fragment  No significant hip degenerative changes  No lytic or blastic osseous lesion  Soft tissues are unremarkable  Degenerative changes visualized lower lumbar spine  Impression: Impacted intertrochanteric fracture with displaced lesser trochanter  Workstation performed: [id number]     CT pelvis wo contrast    Result Date: 3/25/2023  Narrative: CT PELVIS WITHOUT IV CONTRAST INDICATION:   trauma  COMPARISON:  None  TECHNIQUE: CT examination of the pelvis was performed without intravenous contrast  Multiplanar 2D reformatted images were created from the source data  Radiation dose length product (DLP) for this visit:  700 mGy-cm   This examination, like all CT scans performed in the [facility], was performed utilizing techniques to minimize radiation dose exposure, including the use of iterative reconstruction and automated exposure control  FINDINGS: VISUALIZED KIDNEYS/URETERS:  No significant abnormality identified in the partially imaged kidneys and ureters  REPRODUCTIVE ORGANS:  Unremarkable for patient's age  URINARY BLADDER:  Unremarkable  APPENDIX:  No findings to suggest appendicitis  VISUALIZED BOWEL:  Unremarkable  ABDOMINOPELVIC CAVITY:  No ascites or free intraperitoneal air    There is a 17 x 14 mm soft tissue nodule noted adjacent to the rectus sigmoid junction, to the right of midline (series 3, image 51)  VISUALIZED VESSELS:  Unremarkable for patient's age  ABDOMINOPELVIC WALL/INGUINAL REGIONS: Unremarkable  OSSEOUS STRUCTURES:  There is an acute mildly displaced comminuted intertrochanteric fracture of the proximal right femur with the fracture extending to the proximal femoral diaphysis  The right hip joint space is intact  Impression: Acute mildly displaced comminuted intertrochanteric fracture of the proximal right femur  The right hip joint space is intact  No free air or free fluid within the pelvis  17 mm soft tissue nodule within the deep right pelvis, adjacent to the right through sigmoid colon, which may represent an enlarged lymph node  No prior studies are available for comparison  Tissue sampling and/or PET/CT scan could be performed for further evaluation  Considerations related to the patient's age and/or comorbidities may be used to alter these recommendations  Workstation performed: [id number]     XR chest 1 view    Result Date: 3/25/2023  Narrative: CHEST INDICATION:   Trauma  COMPARISON:  None EXAM PERFORMED/VIEWS:  XR CHEST 1 VIEW FINDINGS: Cardiomediastinal silhouette appears unremarkable  The lungs are clear  No pneumothorax or pleural effusion  Osseous structures appear within normal limits for patient age  Impression: No acute cardiopulmonary disease  Workstation performed: [id number]     Echo complete w/ contrast if indicated    Result Date: 3/25/2023  Narrative: •  Left Ventricle: Left ventricular cavity size is normal  Wall thickness is mildly increased  The left ventricular ejection fraction is 45 to 50 %  Systolic function is mildly reduced  There is mild global hypokinesis with regional variation  Diastolic function is mildly abnormal, consistent with grade I (abnormal) relaxation  •  IVS: There is abnormal septal motion consistent with conduction abnormality •  Aortic Valve:  The leaflets are moderately "thickened  The leaflets are mildly calcified  There is mildly reduced mobility  There is mild stenosis  •  Mitral Valve: There is mild annular calcification  There is mild regurgitation  •  Tricuspid Valve: There is mild regurgitation  Pulmonary artery pressure around 30 mmHg        [facility]  Cardiology      \"This note was completed in part utilizing m-modal fluency direct voice recognition software  Grammatical errors, random word insertion, spelling mistakes, and incomplete sentences may be an occasional consequence of the system secondary to software limitations, ambient noise and hardware issues  Please read the chart carefully and recognize, using context, where substitutions have occurred  If you have any questions or concerns about the context, text or information contained within the body of this dictation, please contact myself, the provider, for further clarification  \"  "

## 2023-03-26 NOTE — OCCUPATIONAL THERAPY NOTE
Occupational Therapy Evaluation/Treatment     03/26/23 1015   Note Type   Note type Evaluation   Pain Assessment   Pain Assessment Tool 0-10   Pain Score 5   Pain Location/Orientation Orientation: Right;Location: Hip;Location: Leg   Restrictions/Precautions   Weight Bearing Precautions Per Order Yes   RLE Weight Bearing Per Order WBAT   Other Precautions Bed Alarm; Chair Alarm; Fall Risk;Pain   Home Living   Type of Home Assisted living  (lives at [address] [place] [address])   [address];Stairs to enter with rails   Bathroom Shower/Tub Tub/shower unit   Bathroom Toilet Standard   Home Equipment Walker   Additional Comments Pt is a 64 y/o male admitted after pt sustained a fall and now POD #1 s/p R hip IM nail  Prior Function   Level of [place] Independent with ADLs; Independent with functional mobility; Needs assistance with IADLS  (including meals and medication mgt)   Lives With Facility staff   Receives Help From Personal care attendant  ([facility] staff)   IADLs Family/Friend/Other provides transportation; Family/Friend/Other provides meals; Family/Friend/Other provides medication management   Falls in the last 6 months 1 to 4   Vocational Retired   Comments Pt lives in [place] [address] and reports being independent with BADLs and functional mobility without AD  Per pt he was given and was using a RW on the day he fell as he was having L foot pain  Pt fell at home after pt attempted and missed the walker while attempting to use bathroom at night  Subjective   Subjective   (I will try to do it)   ADL   Eating Assistance 6  Modified independent   Grooming Assistance 5  Supervision/Setup   UB Bathing Assistance 4  Minimal Assistance   LB Bathing Assistance 3  Moderate Assistance   UB Dressing Assistance 4  Minimal Assistance   LB Dressing Assistance 3  Moderate [address]  3  Moderate Assistance   Transfers   Sit to Stand 3  Moderate assistance   Additional items Assist x 1; Increased time required;Verbal cues   Stand to Sit 4  Minimal assistance   Additional items Assist x 1; Increased time required;Verbal cues   Stand pivot 4  Minimal assistance   Additional items Assist x 1   Balance   Static Sitting Fair   Dynamic Sitting Fair -   Static Standing Fair -   Dynamic Standing Poor +   Activity Tolerance   Activity Tolerance Patient tolerated treatment well;Patient limited by pain   Nurse Made Aware yes, [place]   RUE Assessment   RUE Assessment WFL   LUE Assessment   LUE Assessment WFL   Cognition   Overall Cognitive Status WFL   Arousal/Participation Alert; Responsive; Cooperative   Attention Within functional limits   Orientation Level Oriented X4   Following Commands Follows multistep commands with increased time or repetition   Assessment   Limitation Decreased ADL status; Decreased UE strength;Decreased endurance;Decreased self-care trans   Prognosis Good   Assessment Patient evaluated by Occupational Therapy  Patient admitted with Closed right hip fracture ([facility] [address] )  The patients occupational profile, medical and therapy history includes a extensive additional review of physical, cognitive, or psychosocial history related to current functional performance  Comorbidity affecting functional mobility and ADLS include:hypertension, schizoaffective disorder history of testicular cancer status post treatment  Prior to admission, patient was independent with functional mobility without assistive device, independent with ADLS, requiring assist for IADLS, an assisted living resident and retired  The evaluation identifies the following performance deficits: weakness, impaired balance, decreased endurance, decreased coordination, increased fall risk, new onset of impairment of functional mobility, decreased ADLS, pain, decreased activity tolerance and decreased strength, that result in activity limitations and/or participation restrictions   This evaluation requires clinical decision making of high complexity, because the patient presents with comorbidites that affect occupational performance and required significant modification of tasks or assistance with consideration of multiple treatment options  The Barthel Index was used as a functional outcome tool presenting with a score of Barthel Index Score: 45, indicating marked limitations of functional mobility and ADLS  The patient's raw score on the AM-PAC Daily Activity Inpatient Short Form is 16  A raw score of less than 19 suggests the patient may benefit from discharge to post-acute rehabilitation services  Please refer to the recommendation of the Occupational Therapist for safe discharge planning  Please refer to the recommendation of the Occupational Therapist for safe DC planning  Patient will benefit from skilled Occupational Therapy services to address above deficits and facilitate a safe return to prior level of function  Goals   Patient Goals get stronger   STG Time Frame   (1-7 days)   Short Term Goal  Goals established to promote Patient Goals: get stronger:  Eating: independent; Grooming: independent seated; Bathing: min assist; Upper Body Dressing supervision; Lower Body Dressing: min assist; Toileting: min assist; Patient will increase stand pivot commode transfer to min assist with rolling walker to increase performance and safety with ADLS and functional mobility; Patient will increase standing tolerance to 4 minutes during ADL task to decrease assistance level and decrease fall risk; Patient will increase bed mobility to min assist in preparation for ADLS and transfers;  Patient will increase functional mobility to and from bathroom with rolling walker with min assist to increase performance with ADLS and to use a toilet; Patient will tolerate 5 minutes of UE ROM/strengthening to increase general activity tolerance and performance in ADLS/IADLS; Patient will improve functional activity tolerance to 5 minutes of sustained functional tasks to increase participation in basic self-care and decrease assistance level;  Patient will be able to to verbalize understanding and perform energy conservation/proper body mechanics during ADLS and functional mobility at least 75% of the time with minimal cueing to decrease signs of fatigue and increase stamina to return to prior level of function; Patient will increase dynamic sitting balance to fair to improve the ability to sit at edge of bed or on a chair for ADLS;  Patient will increase static standing balance to fair+ to improve postural stability and decrease fall risk during standing ADLS and transfers  LTG Time Frame   (8- 14 days)   Long Term Goal Grooming: independent standing at sink; Bathing: supervision and min assist; Upper Body Dressing independent; Lower Body Dressing: supervision; Toileting: supervision; Patient will increase ambulatory standard toilet transfer to supervision with rolling walker to increase performance and safety with ADLS and functional mobility; Patient will increase standing tolerance to 8 minutes during ADL task to decrease assistance level and decrease fall risk; Patient will increase bed mobility to supervision in preparation for ADLS and transfers;  Patient will increase functional mobility to and from bathroom with rolling walker with supervision to increase performance with ADLS and to use a toilet; Patient will tolerate 10 minutes of UE ROM/strengthening to increase general activity tolerance and performance in ADLS/IADLS; Patient will improve functional activity tolerance to 10 minutes of sustained functional tasks to increase participation in basic self-care and decrease assistance level;  Patient will be able to to verbalize understanding and perform energy conservation/proper body mechanics during ADLS and functional mobility at least 90% of the time with no cueing to decrease signs of fatigue and increase stamina to return to prior level of function; Patient will "increase dynamic sitting balance to good to improve the ability to sit at edge of bed or on a chair for ADLS;  Patient will increase dynamic standing balance to fair to improve postural stability and decrease fall risk during standing ADLS and transfers  Pt will score >/= 19/24 on AM-PAC Daily Activity Inpatient scale to promote safe independence with ADLs and functional mobility; Pt will score >/= 75/100 on Barthel Index in order to decrease caregiver assistance needed and increase ability to perform ADLs and functional mobility  Plan   Treatment Interventions ADL retraining;Functional transfer training;UE strengthening/ROM   Goal Expiration Date 04/09/23   OT Frequency   (5x/week)   Recommendation   OT Discharge Recommendation Post acute rehabilitation services   AM-[facility] Daily Activity Inpatient   Lower Body Dressing 2   Bathing 2   Toileting 2   Upper Body Dressing 3   Grooming 3   Eating 4   Daily Activity Raw Score 16   Daily Activity Standardized Score (Calc for Raw Score >=11) 35 96   AM-[facility] Applied Cognition Inpatient   Following a Speech/Presentation 4   Understanding Ordinary Conversation 4   Taking Medications 3   Remembering Where Things Are Placed or Put Away 4   Remembering List of 4-5 Errands 4   Taking Care of Complicated Tasks 3   Applied Cognition Raw Score 22   Applied Cognition Standardized Score 47 83   Barthel Index   Feeding 10   Bathing 0   Grooming Score 0   Dressing Score 5   Bladder Score 10   Bowels Score 10   Toilet Use Score 5   Transfers (Bed/Chair) Score 5   Mobility (Level Surface) Score 0   Stairs Score 0   Barthel Index Score 45   Additional Treatment Session   Start Time 1000   End Time 1015   Treatment Assessment S: \"I will try to do it\" O: Pt received seated in recliner chair and agreeable to OT session  Pt requires moderate assist for functional mobility including stand to sit with mod verbal/tactile/visual cues for hand placement during sit<-> stand transition   Stand pivot " required min/mod A with RW and recommended use of bedside commode for toileting task  Pt expressed difficulty with LB dressing and limitation of pain to R LE/hip, mod/max A for donning and doffing socks  Educated pt and demonstrated use of LHAEs including  Long-handled grabber, shoe horn and sock- aide  Pt showed good understanding  A: Continued skilled OT services recommended to increase pt's overall  Activity tolerance, strength and for education for compensatory strategies and use of  DME/AD to increase ease, safety and reduce pain and discomfort during ADL tasks  At end of session patient seated in bedside chair with all needs met and alarm activated  P: Continue with POC and STR     End of Consult   Education Provided Yes   Patient Position at End of Consult Bedside chair   Nurse Communication Nurse aware of consult   Licensure   [place] License Number  [place] [address] [address] 86, OTR/L [id number]

## 2023-03-26 NOTE — PLAN OF CARE
Problem: PHYSICAL THERAPY ADULT  Goal: Performs mobility at highest level of function for planned discharge setting  See evaluation for individualized goals  Description: Treatment/Interventions: Functional transfer training, LE strengthening/ROM, Therapeutic exercise, Endurance training, Bed mobility, Gait training, Spoke to nursing, OT, ADL retraining          See flowsheet documentation for full assessment, interventions and recommendations  Note: Prognosis: Good  Problem List: Decreased strength, Decreased range of motion, Decreased endurance, Decreased mobility, Decreased cognition, Impaired balance, Impaired judgement, Decreased safety awareness, Pain, Orthopedic restrictions, Decreased skin integrity  Assessment: Patient seen for Physical Therapy evaluation  Patient admitted with Closed right hip fracture ([facility] [address] )  Comorbidities affecting patient's physical performance include: falls and hypertension  Personal factors affecting patient at time of initial evaluation include: lives in 1 story house, ambulating with assistive device, stairs to enter home, inability to ambulate household distances, inability to navigate community distances, limited home support, positive fall history, inability to perform ADLS, inability to perform IADLS  and inability to live alone  Prior to admission, patient was independent with functional mobility without assistive device, independent with ADLS, requiring assist for IADLS and living with staff in a assisted living level home with 3 steps to enter  Please find objective findings from Physical Therapy assessment regarding body systems outlined above with impairments and limitations including weakness, decreased ROM, impaired balance, decreased endurance, gait deviations, pain, decreased activity tolerance, decreased functional mobility tolerance, decreased safety awareness, impaired judgement, fall risk, decreased skin integrity and decreased cognition    The Barthel Index was used as a functional outcome tool presenting with a score of Barthel Index Score: 45 today indicating marked limitations of functional mobility and ADLS  Patient's clinical presentation is currently unstable/unpredictable as seen in patient's presentation of changing level of pain, increased fall risk, new onset of impairment of functional mobility, decreased endurance and new onset of weakness  Pt would benefit from continued Physical Therapy treatment to address deficits as defined above and maximize level of functional mobility  As demonstrated by objective findings, the assigned level of complexity for this evaluation is high  The patient's AM-[facility] Basic Mobility Inpatient Short Form Raw Score is 12  A Raw score of less than or equal to 16 suggests the patient may benefit from discharge to post-acute rehabilitation services  Please also refer to the recommendation of the Physical Therapist for safe discharge planning  PT Discharge Recommendation: Post acute rehabilitation services    See flowsheet documentation for full assessment

## 2023-03-26 NOTE — PHYSICAL THERAPY NOTE
"       PHYSICAL THERAPY EVALUATION/TREATMENT       03/26/23 0909   PT Last Visit   PT Visit Date 03/26/23   Note Type   Note type Evaluation   Pain Assessment   Pain Assessment Tool 0-10   Pain Score 7   Pain Location/Orientation Orientation: Right;Location: Hip   Pain Onset/Description Onset: Ongoing   Effect of Pain on Daily Activities limits rest/mobility   Patient's Stated Pain Goal No pain   Hospital Pain Intervention(s) Repositioned;Medication (See MAR)  (pre-medicated)   Multiple Pain Sites No   Restrictions/Precautions   Weight Bearing Precautions Per Order Yes   RLE Weight Bearing Per Order WBAT   Other Precautions Bed Alarm; Chair Alarm; Fall Risk;Pain   Home Living   Type of Home Assisted living  (L[facility] at [facility])   Home Layout One level;Stairs to enter with rails; Able to live on main level with bedroom/bathroom  ([address])   Home Equipment Walker   Additional Comments Pt reports he has a walker but was not using PTA ; Attempted to use on day of fall due to L foot/ankle pain   Prior Function   Level of [place] Independent with ADLs; Independent with functional mobility; Needs assistance with [address] staff   Receives Help From Personal care attendant  (staff)   IADLs Family/Friend/Other provides transportation; Family/Friend/Other provides meals; Family/Friend/Other provides medication management   Falls in the last 6 months 1 to 4   Vocational Retired   General   Additional Pertinent History Pt is a 65year-old male who was admitted to the hospital on 3/25/23 due to fall - now seen POD #1 s/p R hip IM nail  WBAT     Family/Caregiver Present No   Cognition   Overall Cognitive Status WFL   Orientation Level Oriented X4   Following Commands Follows multistep commands with increased time or repetition   Subjective   Subjective \"Maybe I should get pain medicine first,  (Pt premedicated prior to session - discussed medication and open to participation)   RLE Assessment   RLE Assessment " X  (Hip 3-/5 lr by pain ; Knee/ankle 3+/5)   LLE Assessment   LLE Assessment WFL   Bed Mobility   Supine to Sit 3  Moderate assistance   Additional items Assist x 1;Verbal cues;LE management; Increased time required   Additional Comments transferred to bedside chair   Transfers   Sit to Stand 3  Moderate assistance   Additional items Assist x 1;Verbal cues   Stand to Sit 4  Minimal assistance   Additional items Assist x 1;Verbal cues; Increased time required   Stand pivot 4  Minimal assistance   Additional items Assist x 1;Verbal cues; Increased time required   Ambulation/Elevation   Gait pattern Antalgic;Decreased R stance; Short stride; Foward flexed; Step to;Excessively slow   Gait Assistance 4  Minimal assist   Additional items Assist x 1;Verbal cues   Assistive Device Rolling walker   Distance 2 feet to chair   Stair Management Assistance Not tested   Balance   Static Sitting Fair   Dynamic Sitting Fair -   Static Standing Fair -   Dynamic Standing Poor +   Ambulatory Poor +   Activity Tolerance   Activity Tolerance Patient tolerated treatment well;Patient limited by pain   Nurse Made Aware yes   Assessment   Prognosis Good   Problem List Decreased strength;Decreased range of motion;Decreased endurance;Decreased mobility; Decreased cognition; Impaired balance; Impaired judgement;Decreased safety awareness;Pain;Orthopedic restrictions;Decreased skin integrity   Assessment Patient seen for Physical Therapy evaluation  Patient admitted with Closed right hip fracture ([facility] [address] )  Comorbidities affecting patient's physical performance include: falls and hypertension    Personal factors affecting patient at time of initial evaluation include: lives in 1 story house, ambulating with assistive device, stairs to enter home, inability to ambulate household distances, inability to navigate community distances, limited home support, positive fall history, inability to perform ADLS, inability to perform IADLS  and inability to live alone  Prior to admission, patient was independent with functional mobility without assistive device, independent with ADLS, requiring assist for IADLS and living with staff in a assisted living level home with 3 steps to enter  Please find objective findings from Physical Therapy assessment regarding body systems outlined above with impairments and limitations including weakness, decreased ROM, impaired balance, decreased endurance, gait deviations, pain, decreased activity tolerance, decreased functional mobility tolerance, decreased safety awareness, impaired judgement, fall risk, decreased skin integrity and decreased cognition  The Barthel Index was used as a functional outcome tool presenting with a score of Barthel Index Score: 45 today indicating marked limitations of functional mobility and ADLS  Patient's clinical presentation is currently unstable/unpredictable as seen in patient's presentation of changing level of pain, increased fall risk, new onset of impairment of functional mobility, decreased endurance and new onset of weakness  Pt would benefit from continued Physical Therapy treatment to address deficits as defined above and maximize level of functional mobility  As demonstrated by objective findings, the assigned level of complexity for this evaluation is high  The patient's AM-[facility] Basic Mobility Inpatient Short Form Raw Score is 12  A Raw score of less than or equal to 16 suggests the patient may benefit from discharge to post-acute rehabilitation services  Please also refer to the recommendation of the Physical Therapist for safe discharge planning  Goals   Patient Goals to get better   STG Expiration Date 04/02/23   Short Term Goal #1 Pt will perform bed mobility with Min A ; Pt will perform bed <> chair transfer with Min A + RW ; Pt will improve RLE strength by 1/2 grade to improve tolerance to functional mobility ;  Pt will be I with HEP   Short Term Goal #2 Pt will ambulate x 50 feet "with Min A + RW ; AMPAC score will improve > 14/24 to demonstrate improved functional independence   LTG Expiration Date 04/09/23   Long Term Goal #1 Pt will perform bed mobility with supervision ; Pt will perform bed <> chair transfer with supervision +RW ; Pt will improve RLE strength by 1/2 grade to improve tolerance to functional mobility   Long Term Goal #2 Pt will ambulate x 150 feet with supervision + RW ; Pt will negotiate x 1 step/curb with Min A ; AMPAC score will improve > 16/24 to demonstrate improved functional independence   Plan   Treatment/Interventions Functional transfer training;LE strengthening/ROM; Therapeutic exercise; Endurance training;Bed mobility;Gait training;Spoke to nursing;OT;ADL retraining   PT Frequency Other (Comment)  (daily)   Recommendation   PT Discharge Recommendation Post acute rehabilitation services   AM-PAC Basic Mobility Inpatient   Turning in Flat Bed Without Bedrails 2   Lying on Back to Sitting on Edge of Flat Bed Without Bedrails 2   Moving Bed to Chair 2   Standing Up From Chair Using Arms 2   Walk in Room 3   Climb 3-5 Stairs With Railing 1   Basic Mobility Inpatient Raw Score 12   Basic Mobility Standardized Score 32 23   Highest Level Of Mobility   -[facility] Goal 4: Move to chair/commode   -HL Achieved 5: Stand (1 or more minutes)   Barthel Index   Feeding 10   Bathing 0   Grooming Score 0   Dressing Score 5   Bladder Score 10   Bowels Score 10   Toilet Use Score 5   Transfers (Bed/Chair) Score 5   Mobility (Level Surface) Score 0   Stairs Score 0   Barthel Index Score 45   Additional Treatment Session   Start Time 0930   End Time 0940   Treatment Assessment S: \"You know it isn't too bad\" O/A: Pt agreeable to PT session this AM  Able to perform exercise in supine/sitting as mentioned below with decreased ROM due to pain  Able to perform bed <> chair transfer with Min/Mod A using RW - repositioned for comfort in bedside chair with all needs within reach   P: pt will " benefit from skilled PT to address deficits to maximize IND for safe d/c   Equipment Use walker   Exercises   Neuro re-ed Pt able to perform supine/seated exercise including ankle pumps, LAQ, quad sets, heel slides x 10 reps R   End of Consult   Patient Position at End of Consult Bedside chair;Bed/Chair alarm activated; All needs within reach   [facility] [place] Insurance Number  [employer] [id number]

## 2023-03-26 NOTE — PROGRESS NOTES
[facility] Internal Medicine Progress Note  Patient: Travis Rai 65 y o  male   MRN: [id number]  PCP: Meredith West DO  Unit/Bed#: [address] [place] [address] Encounter: [id number]  Date Of Visit: 03/26/23    Problem List:    Principal Problem:    Closed right hip fracture ([address] )  Active Problems:    Hypertension    Abnormal EKG    Schizophrenia (HCC)    Anemia    Leukocytosis    Abnormal CT scan      Assessment & Plan:    * Closed right hip fracture [facility])  Assessment & Plan  Presented after mechanical fall and right hip pain  Noted to have acute mildly displaced comminuted intertrochanteric fracture of the proximal right femur  The right hip joint space is intact  No free air or free fluid within the pelvis  Evaluated by orthopedic, recommended surgical patient  Seen by cardiology for perioperative evaluation  Status post ORIF right intertrochanteric femoral fracture with short IM, 3/25  Doing well postoperatively  · PT/OT, weightbearing as tolerated right lower extremity  · Follow-up labs, trend hemoglobin  · Discontinue IV fluid  · Pain control  · Follow-up labs  · DVT prophylaxis postoperatively with Lovenox      Abnormal EKG  Assessment & Plan  EKG revealed chronic RBBB  Seen by cardiology perioperatively, input appreciated  2D echo with EF 45-50%, mild aortic stenosis  · Continue metoprolol  · Change aspirin to once a day  · Resume losartan if blood pressure and renal function remained stable  · Check lipid profile  · Cardiology follow-up    Hypertension  Assessment & Plan  Continue amlodipine and metoprolol  Hold losartan for now, will resume postoperatively with holding parameters if needed    Abnormal CT scan  Assessment & Plan  CT scan of the pelvis incidentally noted a 17 x 14 mm soft tissue nodule noted adjacent to the rectus sigmoid junction, to the right of midline Which may represent enlarged lymph node    Tissue sampling and/or PET/CT was recommended for further evaluation  Discussed findings with the patient, we will recommend follow-up on discharge    Leukocytosis  Assessment & Plan  Likely reactive  Downtrending  Monitor    Anemia  Assessment & Plan  Noted to have hemoglobin of 9 5 g/dL, normocytic  Hemoglobin was 11 2 g/dL on 5/22 on care everywhere  Patient with history of EGD in 7/21 with evidence of erosive gastritis and polyps were removed  Patient also reports that he had colonoscopy previously but not sure how long ago  Hemoglobin lower at 7 5 g/dL postoperatively  Reported minimal EBL Intra-Op  No evidence of overt bleeding  · Iron studies noted with low serum iron and iron sat  Depressed TIBC and normal ferritin  · We will consider Venofer if continues to decline  · Stool occult  · Discontinue IV fluid  · Continue to trend    Schizophrenia ([address] )  Assessment & Plan  Currently appears to be at baseline  Continue Klonopin, Depakote    Hypomagnesemia-repleted      VTE Pharmacologic Prophylaxis: VTE Score: 9 Moderate Risk (Score 3-4) - Pharmacological DVT Prophylaxis Ordered: enoxaparin (Lovenox)  Patient Centered Rounds: I performed bedside rounds with nursing staff today  Discussions with Specialists or Other Care Team Provider: yes    Education and Discussions with Family / Patient: Discussed with the patient  Time Spent for Care: 45 minutes  More than 50% of total time spent on counseling and coordination of care as described above  Current Length of Stay: 1 day(s)  Current Patient Status: Inpatient   Certification Statement: The patient will continue to require additional inpatient hospital stay due to Postoperative monitoring  Discharge Plan: Anticipate discharge in 24-48 hrs to discharge location to be determined pending rehab evaluations      Code Status: Level 1 - Full Code    Subjective:   Doing up in chair  Reports that he is feeling well  Denies any significant postoperative pain  Denies any chest pain shortness of breath or dizziness  Reports good appetite    Discussed with patient regarding incidental finding noted on CT patient reported that he was told in the past that he had some kind of nodule before    Objective:     Vitals:   Temp (24hrs), Av 8 °F (36 6 °C), Min:97 2 °F (36 2 °C), Max:98 5 °F (36 9 °C)    Temp:  [97 2 °F (36 2 °C)-98 5 °F (36 9 °C)] 97 7 °F (36 5 °C)  HR:  [54-72] 72  Resp:  [13-18] 16  BP: (110-156)/(53-72) 134/57  SpO2:  [98 %-99 %] 99 %  Body mass index is 21 71 kg/m²  Input and Output Summary (last 24 hours): Intake/Output Summary (Last 24 hours) at 3/26/2023 1419  Last data filed at 3/25/2023 2300  Gross per 24 hour   Intake 100 ml   Output 450 ml   Net -350 ml       Physical Exam:   Physical Exam  Constitutional:       General: He is not in acute distress  HENT:      Head: Normocephalic and atraumatic  Cardiovascular:      Rate and Rhythm: Normal rate  Pulmonary:      Effort: Pulmonary effort is normal  No respiratory distress  Breath sounds: No wheezing, rhonchi or rales  Chest:      Chest wall: No tenderness  Abdominal:      General: Bowel sounds are normal  There is no distension  Palpations: Abdomen is soft  Tenderness: There is no abdominal tenderness  There is no guarding or rebound  Musculoskeletal:      Right lower leg: No edema  Left lower leg: No edema  Comments: Hip dressing C/D/I   Skin:     General: Skin is warm and dry  Findings: No rash  Neurological:      Mental Status: He is alert  Mental status is at baseline  Cranial Nerves: No cranial nerve deficit     Psychiatric:         Mood and Affect: Mood normal          Additional Data:     Labs:  Results from last 7 days   Lab Units 23  0542 23  0451   WBC Thousand/uL 11 65* 12 59*   HEMOGLOBIN g/dL 7 5* 9 5*   HEMATOCRIT % 23 6* 30 4*   PLATELETS Thousands/uL 304 369   NEUTROS PCT %  --  81*   LYMPHS PCT %  --  10*   MONOS PCT %  --  7   EOS PCT %  --  1     Results from last 7 days   Lab Units 23  0542 "03/25/23  0451   SODIUM mmol/L 136 138   POTASSIUM mmol/L 3 8 4 2   CHLORIDE mmol/L 104 104   CO2 mmol/L 24 26   BUN mg/dL 11 14   CREATININE mg/dL 0 53* 0 64   ANION GAP mmol/L 8 8   CALCIUM mg/dL 7 7* 8 8   ALBUMIN g/dL  --  3 2*   TOTAL BILIRUBIN mg/dL  --  0 29   ALK PHOS U/L  --  89   ALT U/L  --  10   AST U/L  --  12*   GLUCOSE RANDOM mg/dL 118 131     Results from last 7 days   Lab Units 03/25/23  1119   INR  1 17                   Lines/Drains:  Invasive Devices     Peripheral Intravenous Line  Duration           Peripheral IV 03/25/23 Dorsal (posterior); Right Forearm 1 day                      Imaging: Reviewed radiology reports from this admission including: chest xray and CT extremity    Recent Cultures (last 7 days):         Last 24 Hours Medication List:   Current Facility-Administered Medications   Medication Dose Route Frequency Provider Last Rate   • acetaminophen  650 mg Oral Q6H PRN Black Palacio MD     • amLODIPine  5 mg Oral Daily Black Palacio MD     • aspirin  81 mg Oral BID Black Palacio MD     • clonazePAM  0 25 mg Oral Daily Black Palacio MD     • divalproex sodium  125 mg Oral [facility] Black Palacio MD     • docusate sodium  100 mg Oral BID Black Palacio MD     • enoxaparin  40 mg Subcutaneous Q24H [address] Panda Gomez PA-C     • HYDROmorphone  0 2 mg Intravenous Q4H PRN Black Palacio MD     • metoprolol tartrate  50 mg Oral Q12H [address] Black Palacio MD     • ondansetron  4 mg Intravenous Q6H PRN Black Palacio MD     • oxyCODONE  5 mg Oral Q4H PRN Panda Gomez PA-C     • pantoprazole  40 mg Oral Early Morning Shaylee Saba MD          Today, Patient Was Seen By: Shaylee Saba MD    ** Please Note: \"This note has been constructed using a voice recognition system  Therefore there may be syntax, spelling, and/or grammatical errors  Please call if you have any questions   \"**  "

## 2023-03-26 NOTE — ASSESSMENT & PLAN NOTE
EKG revealed chronic RBBB  Seen by cardiology perioperatively, input appreciated  2D echo with EF 45-50%, mild aortic stenosis  · Continue metoprolol  · Changed aspirin to once a day  · Resumed losartan  · LDL 54  · Cardiology follow-up after discharge

## 2023-03-27 VITALS
DIASTOLIC BLOOD PRESSURE: 58 MMHG | OXYGEN SATURATION: 100 % | RESPIRATION RATE: 18 BRPM | BODY MASS INDEX: 21.77 KG/M2 | HEART RATE: 53 BPM | SYSTOLIC BLOOD PRESSURE: 131 MMHG | WEIGHT: 147 LBS | HEIGHT: 69 IN | TEMPERATURE: 98.1 F

## 2023-03-27 PROBLEM — D72.829 LEUKOCYTOSIS: Status: RESOLVED | Noted: 2023-03-25 | Resolved: 2023-03-27

## 2023-03-27 LAB
ANION GAP SERPL CALCULATED.3IONS-SCNC: 7 MMOL/L (ref 4–13)
ATRIAL RATE: 62 BPM
BUN SERPL-MCNC: 14 MG/DL (ref 5–25)
CALCIUM SERPL-MCNC: 8 MG/DL (ref 8.4–10.2)
CHLORIDE SERPL-SCNC: 103 MMOL/L (ref 96–108)
CHOLEST SERPL-MCNC: 97 MG/DL
CO2 SERPL-SCNC: 26 MMOL/L (ref 21–32)
CREAT SERPL-MCNC: 0.52 MG/DL (ref 0.6–1.3)
ERYTHROCYTE [DISTWIDTH] IN BLOOD BY AUTOMATED COUNT: 17.2 % (ref 11.6–15.1)
GFR SERPL CREATININE-BSD FRML MDRD: 102 ML/MIN/1.73SQ M
GLUCOSE SERPL-MCNC: 112 MG/DL (ref 65–140)
HCT VFR BLD AUTO: 25.5 % (ref 36.5–49.3)
HCT VFR BLD AUTO: 25.5 % (ref 36.5–49.3)
HDLC SERPL-MCNC: 28 MG/DL
HGB BLD-MCNC: 7.8 G/DL (ref 12–17)
HGB BLD-MCNC: 7.8 G/DL (ref 12–17)
LDLC SERPL CALC-MCNC: 54 MG/DL (ref 0–100)
MAGNESIUM SERPL-MCNC: 1.7 MG/DL (ref 1.9–2.7)
MCH RBC QN AUTO: 25.5 PG (ref 26.8–34.3)
MCHC RBC AUTO-ENTMCNC: 30.6 G/DL (ref 31.4–37.4)
MCV RBC AUTO: 83 FL (ref 82–98)
P AXIS: 81 DEGREES
PLATELET # BLD AUTO: 345 THOUSANDS/UL (ref 149–390)
PMV BLD AUTO: 9.5 FL (ref 8.9–12.7)
POTASSIUM SERPL-SCNC: 3.8 MMOL/L (ref 3.5–5.3)
PR INTERVAL: 130 MS
QRS AXIS: -1 DEGREES
QRSD INTERVAL: 110 MS
QT INTERVAL: 430 MS
QTC INTERVAL: 436 MS
RBC # BLD AUTO: 3.06 MILLION/UL (ref 3.88–5.62)
SODIUM SERPL-SCNC: 136 MMOL/L (ref 135–147)
T WAVE AXIS: 62 DEGREES
TRIGL SERPL-MCNC: 77 MG/DL
VENTRICULAR RATE: 62 BPM
WBC # BLD AUTO: 11.2 THOUSAND/UL (ref 4.31–10.16)

## 2023-03-27 RX ORDER — ENOXAPARIN SODIUM 100 MG/ML
40 INJECTION SUBCUTANEOUS
Refills: 0
Start: 2023-03-28 | End: 2023-05-07

## 2023-03-27 RX ORDER — POLYETHYLENE GLYCOL 3350 17 G/17G
17 POWDER, FOR SOLUTION ORAL DAILY PRN
Refills: 0
Start: 2023-03-27

## 2023-03-27 RX ORDER — OXYCODONE HYDROCHLORIDE 5 MG/1
5 TABLET ORAL EVERY 6 HOURS PRN
Qty: 5 TABLET | Refills: 0 | Status: SHIPPED | OUTPATIENT
Start: 2023-03-27

## 2023-03-27 RX ORDER — LANOLIN ALCOHOL/MO/W.PET/CERES
400 CREAM (GRAM) TOPICAL 2 TIMES DAILY
Refills: 0
Start: 2023-03-27

## 2023-03-27 RX ORDER — LOSARTAN POTASSIUM 50 MG/1
50 TABLET ORAL DAILY
Status: DISCONTINUED | OUTPATIENT
Start: 2023-03-28 | End: 2023-03-27 | Stop reason: HOSPADM

## 2023-03-27 RX ORDER — ACETAMINOPHEN 325 MG/1
650 TABLET ORAL EVERY 6 HOURS PRN
Refills: 0
Start: 2023-03-27

## 2023-03-27 RX ORDER — PANTOPRAZOLE SODIUM 40 MG/1
40 TABLET, DELAYED RELEASE ORAL
Refills: 0
Start: 2023-03-28

## 2023-03-27 RX ORDER — FERROUS SULFATE TAB EC 324 MG (65 MG FE EQUIVALENT) 324 (65 FE) MG
324 TABLET DELAYED RESPONSE ORAL
Refills: 0
Start: 2023-03-27

## 2023-03-27 RX ORDER — LANOLIN ALCOHOL/MO/W.PET/CERES
400 CREAM (GRAM) TOPICAL 2 TIMES DAILY
Status: DISCONTINUED | OUTPATIENT
Start: 2023-03-27 | End: 2023-03-27 | Stop reason: HOSPADM

## 2023-03-27 RX ORDER — DOCUSATE SODIUM 100 MG/1
100 CAPSULE, LIQUID FILLED ORAL 2 TIMES DAILY
Refills: 0
Start: 2023-03-27

## 2023-03-27 RX ORDER — ASPIRIN 81 MG/1
81 TABLET, CHEWABLE ORAL DAILY
Refills: 0
Start: 2023-03-28

## 2023-03-27 RX ADMIN — CLONAZEPAM 0.25 MG: 0.5 TABLET ORAL at 08:21

## 2023-03-27 RX ADMIN — OXYCODONE HYDROCHLORIDE 5 MG: 5 TABLET ORAL at 11:21

## 2023-03-27 RX ADMIN — DIVALPROEX SODIUM 125 MG: 125 TABLET, DELAYED RELEASE ORAL at 14:05

## 2023-03-27 RX ADMIN — DIVALPROEX SODIUM 125 MG: 125 TABLET, DELAYED RELEASE ORAL at 06:16

## 2023-03-27 RX ADMIN — PANTOPRAZOLE SODIUM 40 MG: 40 TABLET, DELAYED RELEASE ORAL at 06:16

## 2023-03-27 RX ADMIN — METOPROLOL TARTRATE 50 MG: 50 TABLET, FILM COATED ORAL at 08:21

## 2023-03-27 RX ADMIN — ENOXAPARIN SODIUM 40 MG: 40 INJECTION SUBCUTANEOUS at 08:21

## 2023-03-27 RX ADMIN — MAGNESIUM OXIDE TAB 400 MG (241.3 MG ELEMENTAL MG) 400 MG: 400 (241.3 MG) TAB at 09:11

## 2023-03-27 RX ADMIN — ASPIRIN 81 MG CHEWABLE TABLET 81 MG: 81 TABLET CHEWABLE at 08:21

## 2023-03-27 RX ADMIN — AMLODIPINE BESYLATE 5 MG: 5 TABLET ORAL at 08:21

## 2023-03-27 RX ADMIN — DOCUSATE SODIUM 100 MG: 100 CAPSULE, LIQUID FILLED ORAL at 08:21

## 2023-03-27 NOTE — CASE MANAGEMENT
Case Management Discharge Planning Note    Patient name Shanae Hensley  Location [facility] [address] [place] [address] MRN [id number]  : 1945 Date 3/27/2023       Current Admission Date: 3/25/2023  Current Admission Diagnosis:Closed right hip fracture [facility])   Patient Active Problem List    Diagnosis Date Noted   • Abnormal EKG 2023   • Closed right hip fracture ([facility] ) 2023   • Hypertension 2023   • Schizophrenia ([facility] [address] ) 2023   • Anemia 2023   • Abnormal CT scan 2023      LOS (days): 2  Geometric Mean LOS (GMLOS) (days): 2 70  Days to GMLOS:0 4     OBJECTIVE:  Risk of Unplanned Readmission Score: 12 01         Current admission status: Inpatient   Preferred Pharmacy:   [address] [place] [address]  Phone: [phone] Fax: [phone]    Primary Care Provider: Kiko Benjamin DO    Primary Insurance: Magdaleno Ortega W [place]  Secondary Insurance:     DISCHARGE DETAILS:    Discharge planning discussed with[de-identified] Patient  Freedom of Choice: Yes     CM contacted family/caregiver?: No- see comments (Declined call to sister stating that he's been on the phone with her on and off all day today )  Were Treatment Team discharge recommendations reviewed with patient/caregiver?: Yes  Did patient/caregiver verbalize understanding of patient care needs?: N/A- going to facility  Were patient/caregiver advised of the risks associated with not following Treatment Team discharge recommendations?: Yes    [address] [place] [address] Road         Is the patient interested in [facility] at discharge?: No    DME Referral Provided  Referral made for DME?: No    Would you like to participate in our [address] service program?  : No - Declined    Treatment Team Recommendation: Short Term Rehab  Discharge Destination Plan[de-identified] Short Term Rehab [address])  Transport at Discharge : [facility]  Dispatcher Contacted: Yes  Number/Name of Dispatcher: Star Cheese by Chava and Unit #): MAXINE  ETA of Transport (Date): 03/27/23  ETA of Transport (Time): 1700     Additional Comments:   CM s/w patient bedside regarding discharge transport arrangements today  Patient verbalized anxiety but stated thankful for CM's assistance in securing STR at [address] [facility]  Patient reported to this CM that he has called the [facility] regarding his lost checks and that he has reached contacted his sister and his PCP to provide them updates on going to STR  Patient denying any further needs from this CM at this time      Accepting Facility Name, [address], [place]  Receiving Facility/Agency Phone Number: [phone]  Facility/Agency Fax Number: [phone] Number: [phone]    TONEY Harding, SHELBYW, ACRONALD, [facility]  03/27/23 2:34 PM

## 2023-03-27 NOTE — [place] UNIVERSAL TRANSFER FORM
"[place] UNIV[place] TRANSF[place] FOR[place]  (ALL ITEMS MUST BE COMPLETED)    1  TRANSFER FROM: [address] [facility]      TRANSFER TO: [facility]    2  DATE OF TRANSFER: 3/27/2023                        TIME OF TRANSFER: 1700    3  PATIENT NAME: Marge Otoole,        YOB: 1945                             GENDER: male    4  LANGUAGE:   English    5  PHYSICIAN NAME:  Julia Hurley MD                   PHONE: [phone]    6  CODE STATUS: Level 1 - Full Code        Out of Hospital DNR Attached: No    7  :                                      :  Extended Emergency Contact Information  Primary Emergency Contact: [address]  Mobile Phone: [phone]  Relation: [address]  Representative/Proxy:  Yes           Legal Guardian:  No             NAME OF:           HEALTH CARE REPRESENTATIVE/PROXY:                                         OR           LEGAL GUARDIAN, IF NOT :                                               PHONE:  (Day)           (Night)                        (Cell)    8  REASON FOR TRANSFER: (Must include brief medical history and recent changes in physical function or cognition ) R hip fracture            V/S: /55   Pulse 58   Temp 98 2 °F (36 8 °C)   Resp 18   Ht 5' 9\" (1 753 m)   Wt 66 7 kg (147 lb)   SpO2 93%   BMI 21 71 kg/m²           PAIN: Yes, Rating 3 and Site R Hip    9  PRIMARY DIAGNOSIS: Closed right hip fracture (HCC)      Secondary Diagnosis:         Pacemaker: No      Internal Defib: No          Mental Health Diagnosis (if Applicable):    10  RESTRAINTS: No     11  RESPIRATORY NEEDS: None    12  ISOLATION/PRECAUTION: None    13  ALLERGY: Patient has no known allergies  14  SENSORY:       Vision Good, Hearing Good  and Speech Clear    15  SKIN CONDITION: Yes:  Surgical    16  DIET: Regular    17  IV ACCESS: None    18   PERSONAL ITEMS SENT WITH PATIENT: " None    19  ATTACHED DOCUMENTS: MUST ATTACH CURRENT MEDICATION INFORMATION Face Sheet and Discharge Summary    20  AT RISK ALERTS:Falls        HARM TO: N/A    21  WEIGHT BEARING STATUS:         Left Leg: Full        Right Leg: Full    22  MENTAL STATUS:Alert and Oriented    23  FUNCTION:        Walk: With Help        Transfer: With Help        Toilet: With Help        Feed: Self    24  IMMUNIZATIONS/SCREENING:     There is no immunization history on file for this patient  25  BOWEL: Continent    26  BLADDER: Continent    27   SENDING FACILITY CONTACT: Nan Benavidez                  Title: RN        Unit: 2S        Phone: [phone] [address] (if known):        Title:        Unit:         Phone:         FORM PREFILLED BY (if applicable)       Title:       Unit:        Phone:         FORM COMPLETED BY Nan Benavidez RN      Title:       Phone:

## 2023-03-27 NOTE — PLAN OF CARE
Problem: Potential for Falls  Goal: Patient will remain free of falls  Description: INTERVENTIONS:  - Educate patient/family on patient safety including physical limitations  - Instruct patient to call for assistance with activity   - Consult OT/PT to assist with strengthening/mobility   - Keep Call bell within reach  - Keep bed low and locked with side rails adjusted as appropriate  - Keep care items and personal belongings within reach  - Initiate and maintain comfort rounds  - Make Fall Risk Sign visible to staff  - Offer Toileting every  Hours, in advance of need  - Initiate/Maintain alarm  - Obtain necessary fall risk management equipment:   - Apply yellow socks and bracelet for high fall risk patients  - Consider moving patient to room near nurses station  3/27/2023 1431 by Jo Fermin RN  Outcome: Completed  3/27/2023 0852 by Jo Fermin RN  Outcome: Progressing     Problem: MOBILITY - ADULT  Goal: Maintain or return to baseline ADL function  Description: INTERVENTIONS:  -  Assess patient's ability to carry out ADLs; assess patient's baseline for ADL function and identify physical deficits which impact ability to perform ADLs (bathing, care of mouth/teeth, toileting, grooming, dressing, etc )  - Assess/evaluate cause of self-care deficits   - Assess range of motion  - Assess patient's mobility; develop plan if impaired  - Assess patient's need for assistive devices and provide as appropriate  - Encourage maximum independence but intervene and supervise when necessary  - Involve family in performance of ADLs  - Assess for home care needs following discharge   - Consider OT consult to assist with ADL evaluation and planning for discharge  - Provide patient education as appropriate  3/27/2023 1431 by Jo Fermin RN  Outcome: Completed  3/27/2023 0852 by oJ Fermin RN  Outcome: Progressing  Goal: Maintains/Returns to pre admission functional level  Description: INTERVENTIONS:  - Perform BMAT or MOVE assessment daily    - Set and communicate daily mobility goal to care team and patient/family/caregiver  - Collaborate with rehabilitation services on mobility goals if consulted  - Perform Range of Motion  times a day  - Reposition patient every  hours    - Dangle patient  times a day  - Stand patient  times a day  - Ambulate patient  times a day  - Out of bed to chair  times a day   - Out of bed for meals  times a day  - Out of bed for toileting  - Record patient progress and toleration of activity level   3/27/2023 1431 by Torrie Garcia RN  Outcome: Completed  3/27/2023 0852 by Torrie Garcia RN  Outcome: Progressing     Problem: Prexisting or High Potential for Compromised Skin Integrity  Goal: Skin integrity is maintained or improved  Description: INTERVENTIONS:  - Identify patients at risk for skin breakdown  - Assess and monitor skin integrity  - Assess and monitor nutrition and hydration status  - Monitor labs   - Assess for incontinence   - Turn and reposition patient  - Assist with mobility/ambulation  - Relieve pressure over bony prominences  - Avoid friction and shearing  - Provide appropriate hygiene as needed including keeping skin clean and dry  - Evaluate need for skin moisturizer/barrier cream  - Collaborate with interdisciplinary team   - Patient/family teaching  - Consider wound care consult   3/27/2023 1431 by Torrie Garcia RN  Outcome: Completed  3/27/2023 0852 by Torrie Garcia RN  Outcome: Progressing     Problem: MUSCULOSKELETAL - ADULT  Goal: Maintain or return mobility to safest level of function  Description: INTERVENTIONS:  - Assess patient's ability to carry out ADLs; assess patient's baseline for ADL function and identify physical deficits which impact ability to perform ADLs (bathing, care of mouth/teeth, toileting, grooming, dressing, etc )  - Assess/evaluate cause of self-care deficits   - Assess range of motion  - Assess patient's mobility  - Assess patient's need for assistive devices and provide as appropriate  - Encourage maximum independence but intervene and supervise when necessary  - Involve family in performance of ADLs  - Assess for home care needs following discharge   - Consider OT consult to assist with ADL evaluation and planning for discharge  - Provide patient education as appropriate  3/27/2023 1431 by Allie Castañeda RN  Outcome: Completed  3/27/2023 0852 by Allie Castañeda RN  Outcome: Progressing  Goal: Maintain proper alignment of affected body part  Description: INTERVENTIONS:  - Support, maintain and protect limb and body alignment  - Provide patient/ family with appropriate education  3/27/2023 1431 by Allie Castañeda RN  Outcome: Completed  3/27/2023 0852 by Allie Castañeda RN  Outcome: Progressing     Problem: PAIN - ADULT  Goal: Verbalizes/displays adequate comfort level or baseline comfort level  Description: Interventions:  - Encourage patient to monitor pain and request assistance  - Assess pain using appropriate pain scale  - Administer analgesics based on type and severity of pain and evaluate response  - Implement non-pharmacological measures as appropriate and evaluate response  - Consider cultural and social influences on pain and pain management  - Notify physician/advanced practitioner if interventions unsuccessful or patient reports new pain  3/27/2023 1431 by Allie Castañeda RN  Outcome: Completed  3/27/2023 0852 by Allie Castañeda RN  Outcome: Progressing     Problem: SAFETY ADULT  Goal: Patient will remain free of falls  Description: INTERVENTIONS:  - Educate patient/family on patient safety including physical limitations  - Instruct patient to call for assistance with activity   - Consult OT/PT to assist with strengthening/mobility   - Keep Call bell within reach  - Keep bed low and locked with side rails adjusted as appropriate  - Keep care items and personal belongings within reach  - Initiate and maintain comfort rounds  - Make Fall Risk Sign visible to staff  - Offer Toileting every  Hours, in advance of need  - Initiate/Maintain alarm  - Obtain necessary fall risk management equipment:   - Apply yellow socks and bracelet for high fall risk patients  - Consider moving patient to room near nurses station  3/27/2023 1431 by Leona Lundborg, RN  Outcome: Completed  3/27/2023 0852 by Leona Lundborg, RN  Outcome: Progressing

## 2023-03-27 NOTE — CASE MANAGEMENT
Case Management Discharge Planning Note    Patient name Bianca Dotson  Location 18 [facility] [address] [place] [address] MRN [id number]  : 1945 Date 3/27/2023       Current Admission Date: 3/25/2023  Current Admission Diagnosis:Closed right hip fracture [facility])   Patient Active Problem List    Diagnosis Date Noted   • Abnormal EKG 2023   • Closed right hip fracture ([facility] [address] ) 2023   • Hypertension 2023   • Schizophrenia ([facility] [address] ) 2023   • Anemia 2023   • Leukocytosis 2023   • Abnormal CT scan 2023      LOS (days): 2  Geometric Mean LOS (GMLOS) (days): 2 70  Days to GMLOS:0 5     OBJECTIVE:  Risk of Unplanned Readmission Score: 12 01         Current admission status: Inpatient   Preferred Pharmacy:   [address] [place] [address]  Phone: [phone] Fax: [phone]    Primary Care Provider: Casper Nielson DO    Primary Insurance: Stephanie Hi [facility]  Secondary Insurance:     DISCHARGE DETAILS:    Discharge planning discussed with[de-identified] Patient  Freedom of Choice: Yes     CM contacted family/caregiver?: No- see comments (Declined call)  Were Treatment Team discharge recommendations reviewed with patient/caregiver?: Yes  Did patient/caregiver verbalize understanding of patient care needs?: N/A- going to facility  Were patient/caregiver advised of the risks associated with not following Treatment Team discharge recommendations?: Yes    [facility]         Is the patient interested in [facility] at discharge?: No    DME Referral Provided  Referral made for DME?: No    Other Referral/Resources/Interventions Provided:  Interventions: Short Term Rehab    Would you like to participate in our [address] service program?  : No - Declined    Treatment Team Recommendation: Short Term Rehab  Discharge Destination Plan[de-identified] Short Term Rehab [address])  Transport at Discharge : [facility]ulance  Dispatcher Contacted: Yes  Number/Name of Dispatcher: ROUNDTRIP  Transported by [employer] and Unit #): PENDING  ETA of Transport (Date): 03/27/23  ETA of Transport (Time):  (PENDING)     Additional Comments: CM made aware of receipt of authorization to admit to [facility]  Auth information forwarded via [employer]   Transport request sent to [place] Name, [address] : [place]: [place] ([facility])  [place], [place]  Receiving Facility/Agency Phone Number: [phone]  Facility/Agency Fax Number: [address] Number: [phone]    SOHAM IgnacioW, ACSW, ACM, [facility]  03/27/23 12:49 PM

## 2023-03-27 NOTE — INCIDENTAL FINDINGS
The following findings require follow up:  Radiographic finding   Finding: CT scan on the time of admission revealed   17 mm soft tissue nodule within the deep right pelvis, adjacent to the right through sigmoid colon, which may represent an enlarged lymph node       No prior studies are available for comparison   Tissue sampling and/or PET/CT scan could be performed for further evaluation        follow up required: yes   Follow up should be done within 1 month(s)    Please notify the following clinician to assist with the follow up:   Dr Woodrow Charles DO, Will also refer to oncology on discharge

## 2023-03-27 NOTE — UTILIZATION REVIEW
Initial Clinical Review    Admission: Date/Time/Statement:   Admission Orders (From admission, onward)     Ordered        03/25/23 0808  INPATIENT ADMISSION  Once                      Orders Placed This Encounter   Procedures   • INPATIENT ADMISSION     Standing Status:   Standing     Number of Occurrences:   1     Order Specific Question:   Level of Care     Answer:   Med Surg [16]     Order Specific Question:   Estimated length of stay     Answer:   More than 2 Midnights     Order Specific Question:   Certification     Answer:   I certify that inpatient services are medically necessary for this patient for a duration of greater than two midnights  See H&P and MD Progress Notes for additional information about the patient's course of treatment  ED Arrival Information     Expected   -    Arrival   3/25/2023 04:28    Acuity   Emergent            Means of arrival   Ambulance    Escorted by   [facility]    Admission type   Emergency            Arrival complaint   FALL           Chief Complaint   Patient presents with   • Fall     Pt reports r hip pain after fall  Pt was getting up to use the restroom and went to grab walker and fell  Denies hitting head, denies thinners, denies loc       Initial Presentation:   65 yom to ER from home via EMS s/p fall with no head strike, c/o R hip pain  Hx HTN, schizophrenia  Presents with R hip pain & tenderness  Admission work-up showing R hip fx  Admitted to inpatient status for closed R hip fx  Per ortho: Right intertrochanteric femur fracture  OR this afternoon pending cardio clearance  To OR for: Right - INSERTION NAIL IM FEMUR ANTEGRADE (TROCHANTERIC)  Anesthesia Type: General  Operative Findings: Closed displaced comminuted intertrochanteric right femoral fracture    Date: 3/26/23    Day 2:   POD#1: ORIF right intertrochanteric femur fx with short IM nail  Using IV Dilaudid & Oxycodone for pain control  Continue post-op IVABT   Lovenox for DVT prophylaxis  Pt/Ot  Ortho Exam: Patient alert and oriented X3 in NAD lying comfortably in bed  RLE: Dressing CDI  Mild swelling hip  Compartments soft  No calf tenderness  Moves toes/ankle freely  2+ posterior tibial pulse  Sensation intact lateral femoral cutaneous, saphenous, sural, deep/superficial peroneal nerve distributions    Date: 3/27/23  Day 3: Has surpassed a 2nd midnight with active treatments and services  POD#2: pt/ot, pain control  Ortho Exam:   RLE: Dressings CDI  Mild swelling hip  Compartments soft  No calf tenderness  Moves toes/ankle freely  2+ posterior tibial pulse  Sensation intact lateral femoral cutaneous, saphenous, sural, deep/superficial peroneal nerve distributions   SCDs in place    ED Triage Vitals   Temperature Pulse Respirations Blood Pressure SpO2   03/25/23 0430 03/25/23 0430 03/25/23 0430 03/25/23 0430 03/25/23 0430   98 8 °F (37 1 °C) 63 18 141/63 98 %      Temp Source Heart Rate Source Patient Position - Orthostatic VS BP Location FiO2 (%)   03/25/23 0430 03/25/23 0430 03/25/23 0500 03/25/23 0500 --   Oral Monitor Lying Right arm       Pain Score       03/25/23 0430       9          Wt Readings from Last 1 Encounters:   03/25/23 66 7 kg (147 lb)     Additional Vital Signs:   03/27/23 07:13:10 98 2 °F (36 8 °C) 58 18 135/55 82 93 % -- -- -- -- --   03/26/23 22:02:16 98 2 °F (36 8 °C) 59 18 127/60 82 100 % -- -- -- -- --   03/26/23 21:18:29 -- 61 18 146/62 90 100 % -- -- -- -- --   03/26/23 19:03:55 98 °F (36 7 °C) 70 18 122/51 75 100 % -- -- -- -- --   03/26/23 15:22:10 97 7 °F (36 5 °C) 57 18 121/72 88 100 % -- -- -- -- --   03/26/23 0730 -- -- -- -- -- -- -- -- None (Room air) -- --   03/26/23 07:22:16 97 7 °F (36 5 °C) 72 16 134/57 83 99 % -- -- -- -- --   03/26/23 03:28:38 97 8 °F (36 6 °C) 70 18 123/53 76 99 % -- -- None (Room air) -- Lying   03/25/23 22:11:46 -- 70 16 115/64 81 99 % -- -- -- -- --   03/25/23 19:56:50 97 3 °F (36 3 °C) Abnormal  62 16 110/57 75 98 % -- -- -- -- Lying   03/25/23 16:10:44 97 2 °F (36 2 °C) Abnormal  59 16 129/65 86 98 % -- -- -- -- --   03/25/23 1542 98 5 °F (36 9 °C) 61 16 156/72 104 99 % -- -- None (Room air) WDL --   03/25/23 1527 -- 56 14 127/60 86 99 % 36 4 L/min Nasal cannula -- --   03/25/23 1512 98 4 °F (36 9 °C) 54 Abnormal  13 134/61 88 99 % 36 4 L/min Nasal cannula X --     Pertinent Labs/Diagnostic Test Results:   XR hip/pelv 2-3 vws right if performed   Final Result  (03/25 1514)      Fluoroscopic guidance provided for procedure guidance  Please refer to the separate procedure notes for additional details  Workstation performed: [id number]         CT pelvis wo contrast   Final Result  (03/25 0971)      Acute mildly displaced comminuted intertrochanteric fracture of the proximal right femur  The right hip joint space is intact  No free air or free fluid within the pelvis  17 mm soft tissue nodule within the deep right pelvis, adjacent to the right through sigmoid colon, which may represent an enlarged lymph node  No prior studies are available for comparison  Tissue sampling and/or PET/CT scan could be performed for    further evaluation  Considerations related to the patient's age and/or comorbidities may be used to alter these recommendations  XR hip/pelv 2-3 vws right   ED Interpretation  (03/25 0542)   Hip fracture  No dislocation  Final Result  (03/25 1153)      Impacted intertrochanteric fracture with displaced lesser trochanter  XR chest 1 view   ED Interpretation (03/25 0542)   No infiltrates no pneumothorax  Final Result by Emiliano Dailey MD (03/25 1153)      No acute cardiopulmonary disease       3/25 Ekg=  Normal sinus rhythm   Right atrial enlargement   Incomplete right   bundle branch block   Possible lateral infarct, age undetermined      Possible inferior infarct, age undetermined   T wave abnormality   anteriorly   No STEMI   Baseline artifact is present  Aubrie Woodward EKG     3/25 Echo=  •  Left Ventricle: Left ventricular cavity size is normal  Wall thickness is mildly increased  The left ventricular ejection fraction is 45 to 50 %  Systolic function is mildly reduced  There is mild global hypokinesis with regional variation  Diastolic function is mildly abnormal, consistent with grade I (abnormal) relaxation  •  IVS: There is abnormal septal motion consistent with conduction abnormality  •  Aortic Valve: The leaflets are moderately thickened  The leaflets are mildly calcified  There is mildly reduced mobility  There is mild stenosis  •  Mitral Valve: There is mild annular calcification  There is mild regurgitation  •  Tricuspid Valve: There is mild regurgitation    Pulmonary artery pressure around 30 mmHg    Results from last 7 days   Lab Units 03/27/23  0556 03/26/23  0542 03/25/23  0451   WBC Thousand/uL 11 20* 11 65* 12 59*   HEMOGLOBIN g/dL 7 8* 7 5* 9 5*   HEMATOCRIT % 25 5* 23 6* 30 4*   PLATELETS Thousands/uL 345 304 369   NEUTROS ABS Thousands/µL  --   --  10 11*     Results from last 7 days   Lab Units 03/27/23  0556 03/26/23  0542 03/25/23  0451   SODIUM mmol/L 136 136 138   POTASSIUM mmol/L 3 8 3 8 4 2   CHLORIDE mmol/L 103 104 104   CO2 mmol/L 26 24 26   ANION GAP mmol/L 7 8 8   BUN mg/dL 14 11 14   CREATININE mg/dL 0 52* 0 53* 0 64   EGFR ml/min/1 73sq m 102 101 93   CALCIUM mg/dL 8 0* 7 7* 8 8   MAGNESIUM mg/dL 1 7* 1 6*  --      Results from last 7 days   Lab Units 03/25/23  0451   AST U/L 12*   ALT U/L 10   ALK PHOS U/L 89   TOTAL PROTEIN g/dL 7 1   ALBUMIN g/dL 3 2*   TOTAL BILIRUBIN mg/dL 0 29     Results from last 7 days   Lab Units 03/27/23  0556 03/26/23  0542 03/25/23  0451   GLUCOSE RANDOM mg/dL 112 118 131       Results from last 7 days   Lab Units 03/25/23  1119   PROTIME seconds 15 0*   INR  1 17   PTT seconds 39*     Results from last 7 days   Lab Units 03/25/23  0451   FERRITIN ng/mL 151     ED Treatment:   Medication Administration from 03/25/2023 4382 to 03/25/2023 1048       Date/Time Order Dose Route Action     03/25/2023 0448 EDT morphine injection 4 mg 4 mg Intravenous Given     03/25/2023 0538 EDT morphine injection 4 mg 4 mg Intravenous Given     03/25/2023 2389 EDT tranexamic Acid 670 mg in sodium chloride 0 9 % 100 mL IVPB Loading Dose 670 mg Intravenous New Bag     03/25/2023 0708 EDT tranexamic Acid 1,000 mg in sodium chloride 0 9 % 500 mL IVPB 1,000 mg Intravenous New Bag        Past Medical History:   Diagnosis Date   • Hypertension    • Schizophrenia ([place] [address] )      Present on Admission:  • Closed right hip fracture ([place] [address] )  • Hypertension  • Schizophrenia ([place] [address] )  • Anemia  • Leukocytosis  • Abnormal CT scan  • Abnormal EKG      Admitting Diagnosis: Hip fracture ([place] [address] ) [S72 009A]  Schizophrenia ([place] [address] ) [F20 9]  Hip pain [M25 559]  Anemia [D64 9]  Closed displaced intertrochanteric fracture of right femur, initial encounter ([place] [address] ) Lyle Figures  Age/Sex: 65 y o  male  Admission Orders:  Consult cardio  Consult ortho  Scd/foot pumps  Cont pulse ox  Ice to affected area  Pt/ot eval & tx    Scheduled Medications:  Medications 03/25 03/26 03/27   amLODIPine (NORVASC) tablet 5 mg  Dose: 5 mg  Freq: Daily Route: PO  Start: 03/25/23 1115   Admin Instructions:   LOOK ALIKE SOUND ALIKE MED   Order specific questions:   Hold for systolic blood pressure less than (mmHg) 130    1203     1413     1556      0956      0821        aspirin chewable tablet 81 mg  Dose: 81 mg  Freq: Daily Route: PO  Indications of Use: VENOUS THROMBOEMBOLISM  Start: 03/27/23 0900      0821        aspirin chewable tablet 81 mg  Dose: 81 mg  Freq: 2 times daily Route: PO  Indications of Use: VENOUS THROMBOEMBOLISM  Start: 03/25/23 2100 End: 03/26/23 1429    2202      0956     1429-D/C'd       ceFAZolin (ANCEF) IVPB (premix in dextrose) 1,000 mg 50 mL  Dose: 1,000 mg  Freq: Every 8 hours Route: IV  Last Dose: 1,000 mg (03/26/23 1309)  Start: 03/25/23 2200 End: 03/26/23 3861   Admin Instructions: Look alike sound alike  Order specific questions:   Indication: surgical prophylaxis    2202      0613     1309         ceFAZolin (ANCEF) IVPB (premix in dextrose) 1,000 mg 50 mL  Dose: 1,000 mg  Freq: Every 8 hours Route: IV  Start: 03/25/23 1600 End: 03/25/23 1601   Admin Instructions:   Look alike sound alike  Order specific questions:   Indication: surgical prophylaxis    1601-D/C'd  (1603) [C]          ceFAZolin (ANCEF) IVPB (premix in dextrose) 1,000 mg 50 mL  Dose: 1,000 mg  Freq: Once Route: IV  Start: 03/25/23 1600 End: 03/25/23 1559   Admin Instructions:   Look alike sound alike  Order specific questions:   Indication: surgical prophylaxis    1559-D/C'd        clonazePAM (KlonoPIN) tablet 0 25 mg  Dose: 0 25 mg  Freq: Daily Route: PO  Start: 03/25/23 1115   Admin Instructions:   High alert medication  LOOK ALI SOUND [facility]    0004     5286     3314      1079      0821        divalproex sodium (DEPAKOTE) EC tablet 125 mg  Dose: 125 mg  Freq: Every 8 hours scheduled Route: PO  Start: 03/25/23 1400   Admin Instructions:   Swallow whole; do not crush, chew or split    1413     1556     1603 [C]     2203      0614     1309     2116      0616     1400     2200        docusate sodium (COLACE) capsule 100 mg  Dose: 100 mg  Freq: 2 times daily Route: PO  Indications of Use: CONSTIPATION  Start: 03/25/23 1115    1203     1413     1556     1715      0956     1636      0821     1800        enoxaparin (LOVENOX) subcutaneous injection 40 mg  Dose: 40 mg  Freq: Every 24 hours scheduled Route: SC  Start: 03/26/23 0900   Admin Instructions:   High Alert Medication, Check for allergies to pork or pork derivatives/dietary restrictions before administration  [facility]     8815      [id number]        magnesium Oxide (MAG-OX) tablet 400 mg  Dose: 400 mg  Freq: 2 times daily Route: PO  Start: 03/27/23 0900      0911     1800        magnesium sulfate 2 g/50 mL IVPB (premix) 2 g  Dose: 2 g  Freq:  Once Route: IV  Last Dose: 2 g (03/26/23 0956)  Start: 03/26/23 0730 End: 03/26/23 1156   Admin Instructions:   High alert medication  4432         metoprolol tartrate (LOPRESSOR) tablet 50 mg  Dose: 50 mg  Freq: Every 12 hours scheduled Route: PO  Start: 03/25/23 1115   Admin Instructions:   Hold for heart rate less than 50 beats per minute  LOOK ALIKE SOUND ALIKE MED   Order specific questions:   Hold for systolic blood pressure less than (mmHg) 110    1203     1413     9085 (8921) 5626 5945 3537     2100        morphine injection 4 mg  Dose: 4 mg  Freq: Once Route: IV  Start: 03/25/23 0530 End: 03/25/23 0538   Admin Instructions:   High alert medication  LOOK ALIKE SOUND ALIKE MED    8585          morphine injection 4 mg  Dose: 4 mg  Freq: Once Route: IV  Start: 03/25/23 0445 End: 03/25/23 0448   Admin Instructions:   High alert medication  LOOK ALIKE SOUND ALIKE MED    0448          pantoprazole (PROTONIX) EC tablet 40 mg  Dose: 40 mg  Freq: Daily (early morning) Route: PO  Start: 03/26/23 0730   Admin Instructions:   Swallow whole; do not crush, chew or split  LOOK ALIKE SOUND ALIKE MED     1005      [id number]         tranexamic Acid 670 mg in sodium chloride 0 9 % 100 mL IVPB Loading Dose  Dose: 10 mg/kg  Weight Dosing Info: 67 1 kg  Freq: Once Route: IV  Last Dose: Stopped (03/25/23 0708)  Start: 03/25/23 0630 End: 03/25/23 0708    1590     0708          Followed by  tranexamic Acid 1,000 mg in sodium chloride 0 9 % 500 mL IVPB  Dose: 1,000 mg  Freq:  Once Route: IV  Last Dose: 1,000 mg (03/25/23 0708)  Start: 03/25/23 0630 End: 03/25/23 1508    0708          Medications 03/25 03/26 03/27         Continuous Meds Sorted by Name  for Luis England as of 03/27/23 1103  Legend:                        Inactive     Active    Other Encounter     Linked                 Medications 03/25 03/26 03/27   sodium chloride 0 9 % infusion  Rate: 100 mL/hr Dose: 100 mL/hr  Freq: Continuous Route: IV  Indications of Use: IV Hydration  Last Dose: 100 mL/hr (03/25/23 2222)  Start: 03/25/23 1115 End: 03/26/23 1245    1203     1414     1515     2222      1245-D/C'd             PRN Meds Sorted by Name  for Urszula Hoff as of 03/27/23 1104  Legend:                        Inactive     Active    Other Encounter     Linked                 Medications 03/25 03/26 03/27   acetaminophen (TYLENOL) tablet 650 mg  Dose: 650 mg  Freq: Every 6 hours PRN Route: PO  PRN Reasons: mild pain,headaches,fever  Indications of Use: FEVER,HEADACHE,MILD PAIN  Start: 03/25/23 1112    1413     1556          ceFAZolin (ANCEF) IVPB (premix in dextrose)  Freq: As needed Route: IV  Start: 03/25/23 1415 End: 03/25/23 1514    1415     1514-D/C'd        dexamethasone (PF) (DECADRON) injection  Freq: As needed Route: IV  Start: 03/25/23 1427 End: 03/25/23 1508    1427     1508-D/C'd        ePHEDrine injection  Freq: As needed Route: IV  Start: 03/25/23 1432 End: 03/25/23 1508    1432     1508-D/C'd        fentaNYL (SUBLIMAZE) injection 25 mcg  Dose: 25 mcg  Freq: Every 3 minutes PRN Route: IV  PRN Reason: Pain - PACU  PRN Comment: Breakthrough - first line  Start: 03/25/23 1521 End: 03/25/23 1559   Admin Instructions:   ANALGESICS Select a single agent  If more than one agent selected, indicate sequence to follow  NOTE: If pain score greater than 3 after at least 2 doses of medication, the nurse may administer the next sequenced medication  Moving to the next sequenced medication discontinues the previous medication in the sequence  High Alert medication   LOOK ALIKE SOUND ALIKE MED    1559-D/C'd        fentanyl citrate (PF) 100 MCG/2ML  Freq: As needed Route: IV  Start: 03/25/23 1418 End: 03/25/23 1508    1418     1434     1443     1508-D/C'd        HYDROmorphone HCl (DILAUDID) injection 0 2 mg  Dose: 0 2 mg  Freq: Every 4 hours PRN Route: IV  PRN Reason: severe pain  PRN Comment: Breakthrough pain  Start: 03/25/23 [address] [place] [address] Instructions:   High alert medication   LOOK ALIKE SOUND ALIKE MED    2179     8571      7016         lidocaine (PF) (XYLOCAINE-MPF) 1 % injection  Freq: As needed Route: IV  Start: 03/25/23 1418 End: 03/25/23 1508    1418     1508-D/C'd        midazolam (VERSED) injection  Freq: As needed Route: IV  Start: 03/25/23 1406 End: 03/25/23 1508    1406     1508-D/C'd        ondansetron (ZOFRAN) injection 4 mg  Dose: 4 mg  Freq: Every 6 hours PRN Route: IV  PRN Reasons: nausea,vomiting  Start: 03/25/23 1112   Admin Instructions:   Push over 2 minutes  1413     1418     1556          oxyCODONE (ROXICODONE) IR tablet 5 mg  Dose: 5 mg  Freq: Every 4 hours PRN Route: PO  PRN Reason: moderate pain  Start: 03/25/23 1556   Admin Instructions:   High alert medication  LOOK ALIKE SOUND ALIKE MED     2120         propofol (DIPRIVAN) 200 MG/20ML bolus injection  Freq: As needed Route: IV  Start: 03/25/23 1447 End: 03/25/23 1508    1447     1508-D/C'd        tranexamic Acid 1000 MG/10ML injection  Freq: As needed Route: IV  Start: 03/25/23 1355 End: 03/25/23 1515    1355     1515-D/C'd          Network Utilization Review Department  ATTENTION: Please call with any questions or concerns to [phone] and carefully listen to the prompts so that you are directed to the right person  All voicemails are confidential   Anne Medina all requests for admission clinical reviews, approved or denied determinations and any other requests to dedicated fax number below belonging to the campus where the patient is receiving treatment   List of dedicated fax numbers for the Facilities:  [address] DENIALS (Administrative/Medical Necessity) [phone]   [address] (Maternity/NICU/Pediatrics) [phone]   [address] [place] [address] [phone]   1304 [facility] [phone] [address] [place] [address] Mark [facility]Moh[facility] Hospitalm[facility] [address] [facility] [place] [address] [phone]

## 2023-03-27 NOTE — PROGRESS NOTES
-- Patient:  -- MRN: [id number]  -- Aidin Request ID: [id number]  -- Level of care reserved: Laci Og  -- Partner Reserved: [address] [place] [address] [phone]  -- Clinical needs requested:  -- Geography searched: [place] around [facility]  -- Start of Service:  -- Request sent: 9:34am EDT on 3/27/2023 by Neema Oconnor  -- Partner reserved: 10:19am EDT on 3/27/2023 by Neema Oconnor  -- Choice list shared: 10:16am EDT on 3/27/2023 by Neema Oconnor

## 2023-03-27 NOTE — PROGRESS NOTES
"Progress Note - Orthopedics   Lawence Height 65 y o  male MRN: [id number]  Unit/Bed#: 2 [place] [address] Encounter: [id number]        Subjective: POD#2 status post ORIF right intertrochanteric femur fx with short IM nail performed by Dr Osmin Jennings  Patient denies any pain this morning and notes good sensation of the right lower extremity  He denies any CP, SOB, or dizziness  No acute overnight events  Hgb  7 7 this morning  Vitals: Blood pressure 135/55, pulse 58, temperature 98 2 °F (36 8 °C), resp  rate 18, height 5' 9\" (1 753 m), weight 66 7 kg (147 lb), SpO2 93 %  ,Body mass index is 21 71 kg/m²  Intake/Output Summary (Last 24 hours) at 3/27/2023 9049  Last data filed at 3/26/2023 2040  Gross per 24 hour   Intake 240 ml   Output 500 ml   Net -260 ml       Invasive Devices     Peripheral Intravenous Line  Duration           Peripheral IV 03/25/23 Dorsal (posterior); Right Forearm 2 days                  Ortho Exam:   General: Patient alert and oriented X3, in NAD, lying comfortably in chair   RLE: Dressings CDI  Mild swelling hip  Compartments soft  No calf tenderness  Moves toes/ankle freely  2+ posterior tibial pulse  Sensation intact lateral femoral cutaneous, saphenous, sural, deep/superficial peroneal nerve distributions  SCDs in place    Lab, Imaging and other studies: I have personally reviewed pertinent lab results    CBC:   Lab Results   Component Value Date    WBC 11 20 (H) 03/27/2023    HGB 7 8 (L) 03/27/2023    HCT 25 5 (L) 03/27/2023    MCV 83 03/27/2023     03/27/2023    MCH 25 5 (L) 03/27/2023    MCHC 30 6 (L) 03/27/2023    RDW 17 2 (H) 03/27/2023    MPV 9 5 03/27/2023    NRBC 0 03/25/2023     CMP:   Lab Results   Component Value Date     03/27/2023    CO2 26 03/27/2023    BUN 14 03/27/2023    CREATININE 0 52 (L) 03/27/2023    CALCIUM 8 0 (L) 03/27/2023    AST 12 (L) 03/25/2023    ALT 10 03/25/2023    ALKPHOS 89 03/25/2023    EGFR 102 03/27/2023     PT/INR:   Lab Results   Component Value " Date    INR 1 17 03/25/2023       Assessment:  POD #2 status post ORIF right intertrochanteric femur fx with short IM nail    Plan:  PT/OT  WBAT RLE  Mepilex dressings may remain in place for 7 days  Lovenox for DVT prophylaxis for 6 weeks  Post op abx completed  Analgesia prn  Patient has baseline anemia which has been stable and not exacerbated by surgery  Ortho will continue to follow  Stable for discharge to a rehab facility from an orthopedic perspective  He should follow-up 2 weeks from surgery for reevaluation      Weight bearing: WBAT with assistance      VTE Pharmacologic Prophylaxis: Enoxaparin (Lovenox)  VTE Mechanical Prophylaxis: sequential compression device

## 2023-03-27 NOTE — PROGRESS NOTES
[address] Internal Medicine Progress Note  Patient: Kvng Ponce 65 y o  male   MRN: [id number]  PCP: Jung Santana DO  Unit/Bed#: [address] [address] Encounter: [id number]  Date Of Visit: 03/27/23    Problem List:    Principal Problem:    Closed right hip fracture ([address] )  Active Problems:    Hypertension    Abnormal EKG    Schizophrenia (HCC)    Anemia    Leukocytosis    Abnormal CT scan      Assessment & Plan:    * Closed right hip fracture [facility])  Assessment & Plan  Presented after mechanical fall and right hip pain  Noted to have acute mildly displaced comminuted intertrochanteric fracture of the proximal right femur  The right hip joint space is intact  No free air or free fluid within the pelvis  Evaluated by orthopedic, recommended surgical patient  Seen by cardiology for perioperative evaluation  Status post ORIF right intertrochanteric femoral fracture with short IM, 3/25  Doing well postoperatively  · PT/OT, weightbearing as tolerated right lower extremity  · Follow-up labs, trend hemoglobin has remained stable after initial decline  We will recheck  · Pain control  · Follow-up labs  · DVT prophylaxis postoperatively with Lovenox      Abnormal EKG  Assessment & Plan  EKG revealed chronic RBBB  Seen by cardiology perioperatively, input appreciated  2D echo with EF 45-50%, mild aortic stenosis  · Continue metoprolol  · Change aspirin to once a day  · Resume losartan  · LDL 54  · Cardiology follow-up after discharge    Hypertension  Assessment & Plan  Continue amlodipine and metoprolol  Will resume losartan with holding parameters    Abnormal CT scan  Assessment & Plan  CT scan of the pelvis incidentally noted a 17 x 14 mm soft tissue nodule noted adjacent to the rectus sigmoid junction, to the right of midline Which may represent enlarged lymph node    Tissue sampling and/or PET/CT was recommended for further evaluation  Discussed findings with the patient, we will recommend follow-up on discharge  Will refer to hematology/oncology as outpatient  Patient reports that he has been following with his physician in the past and may consider to follow-up with his permission after discharge but would appreciate information regarding local provider for referral    Leukocytosis  Assessment & Plan  Likely reactive  Downtrending spontaneously  Monitor    Anemia  Assessment & Plan  Noted to have hemoglobin of 9 5 g/dL, normocytic  Hemoglobin was 11 2 g/dL on 5/22 on care everywhere  Patient with history of EGD in 7/21 with evidence of erosive gastritis and polyps were removed  Patient also reports that he had colonoscopy previously but not sure how long ago  Hemoglobin lower at 7 5 g/dL postoperatively  Reported minimal EBL Intra-Op  No evidence of overt bleeding  Hemoglobin remained stable after discontinuation of IV fluid  · Iron studies noted with low serum iron and iron sat  Depressed TIBC and normal ferritin  · Monitor hemoglobin  · We will continue PPI  · Iron supplementation with bowel regimen    Schizophrenia ([facility] )  Assessment & Plan  Currently appears to be at baseline  Continue Klonopin, Depakote    Hypomagnesemia-repleted      VTE Pharmacologic Prophylaxis: VTE Score: 9 Moderate Risk (Score 3-4) - Pharmacological DVT Prophylaxis Ordered: enoxaparin (Lovenox)  Patient Centered Rounds: I performed bedside rounds with nursing staff today  Discussions with Specialists or Other Care Team Provider: yes, orthopedics, case management    Education and Discussions with Family / Patient: Discussed with the patient  Time Spent for Care: 45 minutes  More than 50% of total time spent on counseling and coordination of care as described above      Current Length of Stay: 2 day(s)  Current Patient Status: Inpatient   Certification Statement: The patient will continue to require additional inpatient hospital stay due to Postoperative monitoring  Discharge Plan: Anticipate discharge later today or tomorrow to rehab facility  Code Status: Level 1 - Full Code    Subjective:     Doing well, sitting up in chair  Reports minimal postoperative pain  Denies any chest pain shortness of breath      Objective:     Vitals:   Temp (24hrs), Av 9 °F (36 6 °C), Min:97 7 °F (36 5 °C), Max:98 2 °F (36 8 °C)    Temp:  [97 7 °F (36 5 °C)-98 2 °F (36 8 °C)] 98 2 °F (36 8 °C)  HR:  [57-72] 59  Resp:  [16-18] 18  BP: (121-146)/(51-72) 127/60  SpO2:  [99 %-100 %] 100 %  Body mass index is 21 71 kg/m²  Input and Output Summary (last 24 hours): Intake/Output Summary (Last 24 hours) at 3/27/2023 0101  Last data filed at 3/26/2023 2040  Gross per 24 hour   Intake 240 ml   Output 500 ml   Net -260 ml       Physical Exam:   Physical Exam  Constitutional:       General: He is not in acute distress  HENT:      Head: Normocephalic and atraumatic  Cardiovascular:      Rate and Rhythm: Normal rate  Pulmonary:      Effort: Pulmonary effort is normal  No respiratory distress  Breath sounds: No wheezing, rhonchi or rales  Chest:      Chest wall: No tenderness  Abdominal:      General: Bowel sounds are normal  There is no distension  Palpations: Abdomen is soft  Tenderness: There is no abdominal tenderness  There is no guarding or rebound  Musculoskeletal:      Right lower leg: No edema  Left lower leg: No edema  Comments: Right hip dressing C/D/I   Skin:     General: Skin is warm and dry  Findings: No rash  Neurological:      Mental Status: He is alert  Mental status is at baseline  Cranial Nerves: No cranial nerve deficit           Additional Data:     Labs:  Results from last 7 days   Lab Units 23  0542 23  0451   WBC Thousand/uL 11 65* 12 59*   HEMOGLOBIN g/dL 7 5* 9 5*   HEMATOCRIT % 23 6* 30 4*   PLATELETS Thousands/uL 304 369   NEUTROS PCT %  --  81*   LYMPHS PCT %  --  10*   MONOS PCT %  --  7   EOS PCT %  --  1     Results from last 7 days   Lab Units "03/26/23  0542 03/25/23  0451   SODIUM mmol/L 136 138   POTASSIUM mmol/L 3 8 4 2   CHLORIDE mmol/L 104 104   CO2 mmol/L 24 26   BUN mg/dL 11 14   CREATININE mg/dL 0 53* 0 64   ANION GAP mmol/L 8 8   CALCIUM mg/dL 7 7* 8 8   ALBUMIN g/dL  --  3 2*   TOTAL BILIRUBIN mg/dL  --  0 29   ALK PHOS U/L  --  89   ALT U/L  --  10   AST U/L  --  12*   GLUCOSE RANDOM mg/dL 118 131     Results from last 7 days   Lab Units 03/25/23  1119   INR  1 17                   Lines/Drains:  Invasive Devices     Peripheral Intravenous Line  Duration           Peripheral IV 03/25/23 Dorsal (posterior); Right Forearm 1 day                      Imaging: Reviewed radiology reports from this admission including: chest xray and CT extremity    Recent Cultures (last 7 days):         Last 24 Hours Medication List:   Current Facility-Administered Medications   Medication Dose Route Frequency Provider Last Rate   • acetaminophen  650 mg Oral Q6H PRN Alexandra Butt MD     • amLODIPine  5 mg Oral Daily Alexandra Butt MD     • aspirin  81 mg Oral Daily Gomez Pineda MD     • clonazePAM  0 25 mg Oral Daily Alexandra Butt MD     • divalproex sodium  125 mg Oral [facility] Alexandra Butt MD     • docusate sodium  100 mg Oral BID Alexandra Butt MD     • enoxaparin  40 mg Subcutaneous Q24H [facility] Adithya Guerrero PA-C     • HYDROmorphone  0 2 mg Intravenous Q4H PRN Alexandra Butt MD     • metoprolol tartrate  50 mg Oral Q12H [facility] Alexandra Butt MD     • ondansetron  4 mg Intravenous Q6H PRN Alexandra Butt MD     • oxyCODONE  5 mg Oral Q4H PRN Adithya Guerrero PA-C     • pantoprazole  40 mg Oral Early Morning Gomez Pineda MD          Today, Patient Was Seen By: Gomez Pineda MD    ** Please Note: \"This note has been constructed using a voice recognition system  Therefore there may be syntax, spelling, and/or grammatical errors  Please call if you have any questions   \"**  "

## 2023-03-27 NOTE — CASE MANAGEMENT
[address] has received approved authorization from insurance:     Called in by Linwood Wiseman  P#  [phone]   Authorization received for: SNF  Facility: [facility] #: [id number]   Start of Care: 03/27/2023  Next Review Date: 03/29/2023  Care Coordinator: Eulalia GUERRA#: [phone]  Submit next review to: Fax  [phone]  Care Manager notified: Satya Su

## 2023-03-27 NOTE — PHYSICAL THERAPY NOTE
"   PT TREATMENT       03/27/23 1240   PT Last Visit   PT Visit Date 03/27/23   Note Type   Note Type Treatment   Pain Assessment   Pain Assessment Tool 0-10   Pain Score 5   Pain Location/Orientation Orientation: Right;Location: Hip   Pain Onset/Description Onset: Ongoing; Descriptor: Sore   Patient's Stated Pain Goal No pain   Hospital Pain Intervention(s) Repositioned   Multiple Pain Sites No   Restrictions/Precautions   Weight Bearing Precautions Per Order Yes   RLE Weight Bearing Per Order WBAT   Other Precautions Bed Alarm; Chair Alarm; Fall Risk;Pain   General   Chart Reviewed Yes   Family/Caregiver Present No   Cognition   Overall Cognitive Status WFL   Arousal/Participation Cooperative   Attention Within functional limits   Orientation Level Oriented X4   Following Commands Follows multistep commands with increased time or repetition   Subjective   Subjective \"I'm nervous this is all moving too fast\"   Bed Mobility   Additional Comments received sitting in bedside chair   Transfers   Sit to Stand 3  Moderate assistance   Additional items Assist x 1;Verbal cues   Stand to Sit 4  Minimal assistance   Additional items Assist x 1;Verbal cues   Stand pivot 4  Minimal assistance   Additional items Assist x 1;Verbal cues   Ambulation/Elevation   Gait pattern Antalgic;Decreased R stance; Short stride; Step to;Excessively slow;Narrow LIBAN   Gait Assistance 4  Minimal assist   Additional items Assist x 1;Verbal cues   Assistive Device Rolling walker   Distance 10 feet with change of direction   Stair Management Assistance Not tested   Balance   Static Sitting Fair +   Dynamic Sitting Fair   Static Standing Fair -   Dynamic Standing Poor +   Ambulatory Poor +   Activity Tolerance   Activity Tolerance Patient tolerated treatment well;Patient limited by pain   Nurse Made Aware yes   Exercises   Neuro re-ed Pt able to perform exercise in sitting including ankle pumps, LAQ, quad/glute sets, hip abd/add x 10 reps bilat " Assessment   Prognosis Good   Problem List Decreased strength;Decreased endurance;Decreased mobility; Impaired balance;Pain;Decreased range of motion;Decreased skin integrity; Decreased safety awareness   Assessment Pt agreeable to PT session this AM - received sitting in bedside chair with all needs within reach  Able to perform exercise as mentioned above with intermittent assist due to R hip stiffness/pain  Able to progress ambulation x 10 feet with gait deviations as mentioned above with excessivelty slow gait speed + requires frequent verbal/tactile cues for proper sequence when ambulating  Repositioned in bedside chair with all needs within reach  The patient's AM-PAC Basic Mobility Inpatient Short Form Raw Score is 12  A Raw score of less than or equal to 16 suggests the patient may benefit from discharge to post-acute rehabilitation services  Please also refer to the recommendation of the Physical Therapist for safe discharge planning  Goals   Patient Goals to get better   Plan   Treatment/Interventions ADL retraining;LE strengthening/ROM; Therapeutic exercise; Endurance training;Functional transfer training;Bed mobility;Gait training;Spoke to nursing   Progress Progressing toward goals   PT Frequency Other (Comment)  (daily)   Recommendation   PT Discharge Recommendation Post acute rehabilitation services   AM-PAC Basic Mobility Inpatient   Turning in Flat Bed Without Bedrails 2   Lying on Back to Sitting on Edge of Flat Bed Without Bedrails 2   Moving Bed to Chair 2   Standing Up From Chair Using Arms 2   Walk in Room 3   Climb 3-5 Stairs With Railing 1   Basic Mobility Inpatient Raw Score 12   Basic Mobility Standardized Score 32 23   Highest Level Of Mobility   JH-HLM Goal 4: Move to chair/commode   JH-HLM Achieved 6: Walk 10 steps or more   End of Consult   Patient Position at End of Consult Bedside chair;Bed/Chair alarm activated; All needs within reach   [address]  [employer] Aylin Francois [id number]

## 2023-03-27 NOTE — CASE MANAGEMENT
[address] received request for authorization from Care Manager    Authorization request for: SNF  Facility Name: [address] [place] [address]    NPI: [id number]   Facility MD: Dr Donald Colmenares   NPI: [id number]  Authorization initiated by contacting insurance: Gina Christianson Via: [employer]   Pending Reference #: [id number]   Clinicals submitted via: Ankita Turcios

## 2023-03-27 NOTE — CASE MANAGEMENT
Case Management Discharge Planning Note    Patient name Herber Adame  Location [address] MRN [id number]  : 1945 Date 3/27/2023       Current Admission Date: 3/25/2023  Current Admission Diagnosis:Closed right hip fracture [facility])   Patient Active Problem List    Diagnosis Date Noted   • Abnormal EKG 2023   • Closed right hip fracture ([facility] [facility] ) 2023   • Hypertension 2023   • Schizophrenia ([facility] [facility] ) 2023   • Anemia 2023   • Leukocytosis 2023   • Abnormal CT scan 2023      LOS (days): 2  Geometric Mean LOS (GMLOS) (days): 2 70  Days to GMLOS:0 6     OBJECTIVE:  Risk of Unplanned Readmission Score: 11 85         Current admission status: Inpatient   Preferred Pharmacy:   [address] [place] [address] [address] [address]  Phone: [phone] Fax: [phone]    Primary Care Provider: Dorothy Tan DO    Primary Insurance: Olga Aragon [facility]  Secondary Insurance:     DISCHARGE DETAILS:    Discharge planning discussed with[de-identified] Patient  Freedom of Choice: Yes  Comments - Freedom of Choice: Choice is for rehab at [facility] [place]  ([facility])  CM contacted family/caregiver?: No- see comments (Declined called)  Were Treatment Team discharge recommendations reviewed with patient/caregiver?: Yes  Did patient/caregiver verbalize understanding of patient care needs?: N/A- going to facility  Were patient/caregiver advised of the risks associated with not following Treatment Team discharge recommendations?: Yes    Other Referral/Resources/Interventions Provided:  Interventions: Short Term Rehab  Referral Comments: Options reviewed  Choice is for placement at [facility]      Would you like to participate in our [facility] [address] service program?  : No - Declined    Treatment Team Recommendation: Short Term Rehab  Discharge Destination Plan[de-identified] Short Term Rehab [address])  Transport at Discharge : BLS Ambulance Additional Comments: Auth tasked to [address]      Accepting Facility Name, [address] : [place]: [place] ([address] [facility])  [place], [place]  Receiving Facility/Agency Phone Number: [phone]  Facility/Agency Fax Number: [phone]     TONEY Brownlee, ACSW, ACM, [facility]  03/27/23 10:27 AM

## 2023-03-27 NOTE — DISCHARGE INSTRUCTIONS
PT/OT  WBAT RLE  Mepilex dressings may remain in place for 7 days  Lovenox for DVT prophylaxis for 6 weeks  Ortho follow-up 2 weeks from surgery for reevaluation

## 2023-03-27 NOTE — PLAN OF CARE
Problem: Potential for Falls  Goal: Patient will remain free of falls  Description: INTERVENTIONS:  - Educate patient/family on patient safety including physical limitations  - Instruct patient to call for assistance with activity   - Consult OT/PT to assist with strengthening/mobility   - Keep Call bell within reach  - Keep bed low and locked with side rails adjusted as appropriate  - Keep care items and personal belongings within reach  - Initiate and maintain comfort rounds  - Make Fall Risk Sign visible to staff  - Offer Toileting every 2 Hours, in advance of need  - Initiate/Maintain bed alarm  - Obtain necessary fall risk management equipment: walker  - Apply yellow socks and bracelet for high fall risk patients  - Consider moving patient to room near nurses station  Outcome: Progressing     Problem: MOBILITY - ADULT  Goal: Maintain or return to baseline ADL function  Description: INTERVENTIONS:  -  Assess patient's ability to carry out ADLs; assess patient's baseline for ADL function and identify physical deficits which impact ability to perform ADLs (bathing, care of mouth/teeth, toileting, grooming, dressing, etc )  - Assess/evaluate cause of self-care deficits   - Assess range of motion  - Assess patient's mobility; develop plan if impaired  - Assess patient's need for assistive devices and provide as appropriate  - Encourage maximum independence but intervene and supervise when necessary  - Involve family in performance of ADLs  - Assess for home care needs following discharge   - Consider OT consult to assist with ADL evaluation and planning for discharge  - Provide patient education as appropriate  Outcome: Progressing  Goal: Maintains/Returns to pre admission functional level  Description: INTERVENTIONS:  - Perform BMAT or MOVE assessment daily    - Set and communicate daily mobility goal to care team and patient/family/caregiver     - Collaborate with rehabilitation services on mobility goals if consulted  - Perform Range of Motion 2 times a day  - Reposition patient every 2 hours    - Dangle patient 2 times a day  - Stand patient 2 times a day  - Ambulate patient 2 times a day  - Out of bed to chair 2 times a day   - Out of bed for meals 2 times a day  - Out of bed for toileting  - Record patient progress and toleration of activity level   Outcome: Progressing     Problem: Prexisting or High Potential for Compromised Skin Integrity  Goal: Skin integrity is maintained or improved  Description: INTERVENTIONS:  - Identify patients at risk for skin breakdown  - Assess and monitor skin integrity  - Assess and monitor nutrition and hydration status  - Monitor labs   - Assess for incontinence   - Turn and reposition patient  - Assist with mobility/ambulation  - Relieve pressure over bony prominences  - Avoid friction and shearing  - Provide appropriate hygiene as needed including keeping skin clean and dry  - Evaluate need for skin moisturizer/barrier cream  - Collaborate with interdisciplinary team   - Patient/family teaching  - Consider wound care consult   Outcome: Progressing     Problem: MUSCULOSKELETAL - ADULT  Goal: Maintain or return mobility to safest level of function  Description: INTERVENTIONS:  - Assess patient's ability to carry out ADLs; assess patient's baseline for ADL function and identify physical deficits which impact ability to perform ADLs (bathing, care of mouth/teeth, toileting, grooming, dressing, etc )  - Assess/evaluate cause of self-care deficits   - Assess range of motion  - Assess patient's mobility  - Assess patient's need for assistive devices and provide as appropriate  - Encourage maximum independence but intervene and supervise when necessary  - Involve family in performance of ADLs  - Assess for home care needs following discharge   - Consider OT consult to assist with ADL evaluation and planning for discharge  - Provide patient education as appropriate  Outcome: Progressing  Goal: Maintain proper alignment of affected body part  Description: INTERVENTIONS:  - Support, maintain and protect limb and body alignment  - Provide patient/ family with appropriate education  Outcome: Progressing     Problem: PAIN - ADULT  Goal: Verbalizes/displays adequate comfort level or baseline comfort level  Description: Interventions:  - Encourage patient to monitor pain and request assistance  - Assess pain using appropriate pain scale  - Administer analgesics based on type and severity of pain and evaluate response  - Implement non-pharmacological measures as appropriate and evaluate response  - Consider cultural and social influences on pain and pain management  - Notify physician/advanced practitioner if interventions unsuccessful or patient reports new pain  Outcome: Progressing     Problem: SAFETY ADULT  Goal: Patient will remain free of falls  Description: INTERVENTIONS:  - Educate patient/family on patient safety including physical limitations  - Instruct patient to call for assistance with activity   - Consult OT/PT to assist with strengthening/mobility   - Keep Call bell within reach  - Keep bed low and locked with side rails adjusted as appropriate  - Keep care items and personal belongings within reach  - Initiate and maintain comfort rounds  - Make Fall Risk Sign visible to staff  - Offer Toileting every 2 Hours, in advance of need  - Initiate/Maintain bed alarm  - Obtain necessary fall risk management equipment: walker  - Apply yellow socks and bracelet for high fall risk patients  - Consider moving patient to room near nurses station  Outcome: Progressing

## 2023-03-27 NOTE — CASE MANAGEMENT
Case Management Assessment & Discharge Planning Note    Patient name Zaira Judd  Location [address] [address] MRN [id number]  : 1945 Date 3/27/2023       Current Admission Date: 3/25/2023  Current Admission Diagnosis:Closed right hip fracture [facility])   Patient Active Problem List    Diagnosis Date Noted   • Abnormal EKG 2023   • Closed right hip fracture ([facility] [address] ) 2023   • Hypertension 2023   • Schizophrenia ([facility] [address] ) 2023   • Anemia 2023   • Leukocytosis 2023   • Abnormal CT scan 2023      LOS (days): 2  Geometric Mean LOS (GMLOS) (days): 2 70  Days to GMLOS:0 6     OBJECTIVE:    Risk of Unplanned Readmission Score: 11 85         Current admission status: Inpatient       Preferred Pharmacy:   [address] [place] [address] [address] [address]  Phone: [phone] Fax: [phone]    Primary Care Provider: Missy Caba DO    Primary Insurance: Becky Mujica [facility]  Secondary Insurance:     ASSESSMENT:    Patient Information  Admitted from[de-identified] Facility ([facility])  Mental Status: Alert  During Assessment patient was accompanied by: Not accompanied during assessment  Assessment information provided by[de-identified] Patient  Primary Caregiver: Self  Support Systems: Self, Family members, [address] [place] [address] Avenue of Residence: [address] [place] [address] do you live in?: [address] [place] [address] Road entry access options   Select all that apply : Stairs  Number of steps to enter home : 3  Type of Current Residence: Facility ([facility])  Upon entering residence, is there a bedroom on the main floor (no further steps)?: Yes  Upon entering residence, is there a bathroom on the main floor (no further steps)?: Yes  In the last 12 months, was there a time when you were not able to pay the mortgage or rent on time?: No  In the last 12 months, how many places have you lived?: 1  In the last 12 months, was there a time when you did not have a steady place to sleep or slept in a shelter (including now)?: No  Homeless/housing insecurity resource given?: No  Living Arrangements: Lives Alone  Is patient a ?: No    Activities of Daily Living Prior to Admission  Functional Status: Independent  Completes ADLs independently?: Yes  Ambulates independently?: Yes  Does patient use assisted devices?: No  Does patient currently own DME?: Yes  What DME does the patient currently own?: Aruna [employer]  Does patient have a history of Outpatient Therapy (PT/OT)?: No  Does the patient have a history of Short-Term Rehab?: No  Does patient have a history of HHC?: No  Does patient currently have [facility]?: No    Patient Information Continued  Income Source: Pension/alf  Does patient have prescription coverage?: Yes  Within the past 12 months, you worried that your food would run out before you got the money to buy more : Never true  Within the past 12 months, the food you bought just didn't last and you didn't have money to get more : Never true  Food insecurity resource given?: No  Does patient receive dialysis treatments?: No  Does patient have a history of substance abuse?: No  Does patient have a history of Mental Health Diagnosis?: Yes (Schizophrenia)  Is patient receiving treatment for mental health?: Yes  Has patient received inpatient treatment related to mental health in the last 2 years?: No    PHQ 2/9 Screening   Reviewed PHQ 2/9 Depression Screening Score?: No    Means of Transportation  Means of Transport to Appts[de-identified] Friends  In the past 12 months, has lack of transportation kept you from medical appointments or from getting medications?: No  In the past 12 months, has lack of transportation kept you from meetings, work, or from getting things needed for daily living?: No  Was application for public transport provided?: No    DISCHARGE DETAILS:    Discharge planning discussed with[de-identified] Patient  Freedom of Choice: Yes     CM contacted family/caregiver?: No- see comments (Declined call to sister at this time )  Were Treatment Team discharge recommendations reviewed with patient/caregiver?: Yes  Did patient/caregiver verbalize understanding of patient care needs?: N/A- going to facility  Were patient/caregiver advised of the risks associated with not following Treatment Team discharge recommendations?: Yes    [address]         Is the patient interested in [facility] at discharge?: No    DME Referral Provided  Referral made for DME?: No    Other Referral/Resources/Interventions Provided:  Interventions: Short Term Rehab  Referral Comments: [place] referrals opened to determine available options      Would you like to participate in our [address] service program?  : No - Declined    Treatment Team Recommendation: Short Term Rehab  Discharge Destination Plan[de-identified] Short Term Rehab  Transport at Discharge : BLS Ambulance    Luis A Jon, DORON, TORITO, [facility]  03/27/23 9:41 AM

## 2023-03-27 NOTE — CASE MANAGEMENT
Case Management Discharge Planning Note    Patient name Sujit Bush  Location [address] [facility] [address] MRN [id number]  : 1945 Date 3/27/2023       Current Admission Date: 3/25/2023  Current Admission Diagnosis:Closed right hip fracture [facility])   Patient Active Problem List    Diagnosis Date Noted   • Abnormal EKG 2023   • Closed right hip fracture ([facility] [address] ) 2023   • Hypertension 2023   • Schizophrenia ([facility] [address] ) 2023   • Anemia 2023   • Leukocytosis 2023   • Abnormal CT scan 2023      LOS (days): 2  Geometric Mean LOS (GMLOS) (days): 2 70  Days to GMLOS:0 5     OBJECTIVE:  Risk of Unplanned Readmission Score: 12 01         Current admission status: Inpatient   Preferred Pharmacy:   [address] [place] [address]  Phone: [phone] Fax: [phone]    Primary Care Provider: Adrienne Ivan DO    Primary Insurance: Maxim Cuellar [facility]  Secondary Insurance:     [address] [place] [address] Avenue Number: [id number]

## 2023-03-27 NOTE — DISCHARGE SUMMARY
Discharge Summary - [facility] Internal Medicine    Patient Information: Kiel Alvarez 65 y o  male MRN: [id number]  Unit/Bed#: [address] [place] [address] Encounter: [id number]    Discharging Physician / Practitioner: Neal Brittle, MD  PCP: Woodrow Charles DO  Admission Date: 3/25/2023  Discharge Date: 03/27/23    Reason for Admission: Fall (Pt reports r hip pain after fall  Pt was getting up to use the restroom and went to grab walker and fell  Denies hitting head, denies thinners, denies loc)      Discharge Diagnoses:     Principal Problem:    Closed right hip fracture ([address] )  Active Problems:    Hypertension    Abnormal EKG    Schizophrenia (HCC)    Anemia    Leukocytosis    Abnormal CT scan  Resolved Problems:    * No resolved hospital problems  *        * Closed right hip fracture [facility])  Assessment & Plan  Presented after mechanical fall and right hip pain  Noted to have acute mildly displaced comminuted intertrochanteric fracture of the proximal right femur  The right hip joint space is intact  No free air or free fluid within the pelvis  Evaluated by orthopedic, recommended surgical patient  Seen by cardiology for perioperative evaluation  Status post ORIF right intertrochanteric femoral fracture with short IM, 3/25  Doing well postoperatively  · PT/OT, weightbearing as tolerated right lower extremity  · Follow-up labs, trend hemoglobin has remained stable after initial decline      · Pain control as needed, currently well controlled  · Follow-up labs as outpatient  · DVT prophylaxis postoperatively with Lovenox for 6 weeks  · Follow-up with orthopedics in 2 weeks      Abnormal EKG  Assessment & Plan  EKG revealed chronic RBBB  Seen by cardiology perioperatively, input appreciated  2D echo with EF 45-50%, mild aortic stenosis  · Continue metoprolol  · Changed aspirin to once a day  · Resumed losartan  · LDL 54  · Cardiology follow-up after discharge    Hypertension  Assessment & Plan  Continue amlodipine and metoprolol  Resumed losartan with holding parameters    Abnormal CT scan  Assessment & Plan  CT scan of the pelvis incidentally noted a 17 x 14 mm soft tissue nodule noted adjacent to the rectus sigmoid junction, to the right of midline Which may represent enlarged lymph node  Tissue sampling and/or PET/CT was recommended for further evaluation  Discussed findings with the patient, we will recommend follow-up on discharge  Will refer to hematology/oncology as outpatient  Patient reports that he has been following with his physician in the past and may consider to follow-up with his permission after discharge but would appreciate information regarding local provider for referral    Leukocytosis  Assessment & Plan  Likely reactive  Downtrending spontaneously  Monitor    Anemia  Assessment & Plan  Noted to have hemoglobin of 9 5 g/dL, normocytic  Hemoglobin was 11 2 g/dL on 5/22 on care everywhere  Patient with history of EGD in 7/21 with evidence of erosive gastritis and polyps were removed  Patient also reports that he had colonoscopy previously but not sure how long ago  Hemoglobin lower at 7 5 g/dL postoperatively  Reported minimal EBL Intra-Op  No evidence of overt bleeding  Hemoglobin remained stable after discontinuation of IV fluid  · Iron studies noted with low serum iron and iron sat    Depressed TIBC and normal ferritin  · Monitor hemoglobin  · We will continue PPI  · Iron supplementation with bowel regimen postoperatively  · GI referral on discharge    Schizophrenia [facility])  Assessment & Plan  Currently appears to be at baseline  Continue Klonopin, Depakote      Consultations During Hospital Stay:  [address]    Procedures Performed:     Procedure(s):  · INSERTION NAIL IM FEMUR ANTEGRADE (TROCHANTERIC)    Significant Findings:     · Refer to hospital course and above listed diagnosis related plan for details    Imaging while in hospital:    XR hip/pelv 2-3 vws right if performed    Result Date: 3/25/2023  Narrative: C-ARM - right hip INDICATION: T14  8XXA: Other injury of unspecified body region, initial encounter  Procedure guidance  COMPARISON:  Right hip radiographs 3/25/2023 TECHNIQUE: FLUOROSCOPY TIME:   56 0 SECONDS 3 FLUOROSCOPIC IMAGES FINDINGS: Fluoroscopic guidance provided for procedure guidance  Osseous and soft tissue detail limited by technique  Impression: Fluoroscopic guidance provided for procedure guidance  Please refer to the separate procedure notes for additional details  Workstation performed: [id number]     XR hip/pelv 2-3 vws right    Result Date: 3/25/2023  Narrative: RIGHT HIP INDICATION:   trauma  COMPARISON:  None VIEWS:  XR HIP/PELV 2-3 VWS RIGHT W PELVIS IF PERFORMED FINDINGS: Intertrochanteric fracture with proximal migration of shaft  Displaced lesser trochanteric fragment  No significant hip degenerative changes  No lytic or blastic osseous lesion  Soft tissues are unremarkable  Degenerative changes visualized lower lumbar spine  Impression: Impacted intertrochanteric fracture with displaced lesser trochanter  Workstation performed: [id number]     CT pelvis wo contrast    Result Date: 3/25/2023  Narrative: CT PELVIS WITHOUT IV CONTRAST INDICATION:   trauma  COMPARISON:  None  TECHNIQUE: CT examination of the pelvis was performed without intravenous contrast  Multiplanar 2D reformatted images were created from the source data  Radiation dose length product (DLP) for this visit:  700 mGy-cm   This examination, like all CT scans performed in the [facility], was performed utilizing techniques to minimize radiation dose exposure, including the use of iterative reconstruction and automated exposure control  FINDINGS: VISUALIZED KIDNEYS/URETERS:  No significant abnormality identified in the partially imaged kidneys and ureters  REPRODUCTIVE ORGANS:  Unremarkable for patient's age  URINARY BLADDER:  Unremarkable   APPENDIX:  No findings to suggest appendicitis  VISUALIZED BOWEL:  Unremarkable  ABDOMINOPELVIC CAVITY:  No ascites or free intraperitoneal air  There is a 17 x 14 mm soft tissue nodule noted adjacent to the rectus sigmoid junction, to the right of midline (series 3, image 51)  VISUALIZED VESSELS:  Unremarkable for patient's age  ABDOMINOPELVIC WALL/INGUINAL REGIONS: Unremarkable  OSSEOUS STRUCTURES:  There is an acute mildly displaced comminuted intertrochanteric fracture of the proximal right femur with the fracture extending to the proximal femoral diaphysis  The right hip joint space is intact  Impression: Acute mildly displaced comminuted intertrochanteric fracture of the proximal right femur  The right hip joint space is intact  No free air or free fluid within the pelvis  17 mm soft tissue nodule within the deep right pelvis, adjacent to the right through sigmoid colon, which may represent an enlarged lymph node  No prior studies are available for comparison  Tissue sampling and/or PET/CT scan could be performed for further evaluation  Considerations related to the patient's age and/or comorbidities may be used to alter these recommendations  Workstation performed: [id number]     XR chest 1 view    Result Date: 3/25/2023  Narrative: CHEST INDICATION:   Trauma  COMPARISON:  None EXAM PERFORMED/VIEWS:  XR CHEST 1 VIEW FINDINGS: Cardiomediastinal silhouette appears unremarkable  The lungs are clear  No pneumothorax or pleural effusion  Osseous structures appear within normal limits for patient age  Impression: No acute cardiopulmonary disease  Workstation performed: [id number]     Echo complete w/ contrast if indicated    Result Date: 3/25/2023  Narrative: •  Left Ventricle: Left ventricular cavity size is normal  Wall thickness is mildly increased  The left ventricular ejection fraction is 45 to 50 %  Systolic function is mildly reduced  There is mild global hypokinesis with regional variation   Diastolic function is mildly abnormal, consistent with grade I (abnormal) relaxation  •  IVS: There is abnormal septal motion consistent with conduction abnormality •  Aortic Valve: The leaflets are moderately thickened  The leaflets are mildly calcified  There is mildly reduced mobility  There is mild stenosis  •  Mitral Valve: There is mild annular calcification  There is mild regurgitation  •  Tricuspid Valve: There is mild regurgitation  Pulmonary artery pressure around 30 mmHg       Incidental Findings:   · CT scan finding as above    Test Results Pending at Discharge (will require follow up):   · As per After Visit Summary     Outpatient Tests Requested:  · CBC, BMP    Complications:  Refer to hospital course and above listed diagnosis related plan, if any    Hospital Course: As per HPI  Bianca Dotson is a 65 y o  male patient with history of hypertension, schizoaffective disorder, reported history of testicular cancer status posttreatment who originally presented to the hospital on 3/25/2023 due to right hip pain after mechanical fall  Patient reported that he had some left foot discomfort few days ago and was advised to use walker for ambulation  Night prior to admission,  patient woke up to use the bathroom but missed the walker and fell on his right side subsequently had the right hip pain and was not able to get up  Patient was brought to ED for further evaluation  Patient denies any chest pain, shortness of breath, dizziness, palpitation, denies any prior perioperative complications  Patient also denies any history of bleeding, dark stool or melena      In ED patient was afebrile with stable vital signs  Labs revealed mild leukocytosis and anemia  CT scan of the pelvis revealed acute mildly displaced comminuted intratrochanteric fracture of the proximal right femur  No free fluid or air noted    Patient was also noted to have a 17 mm soft tissue nodule in the deep right pelvis adjacent to right sigmoid colon  Patient was seen by orthopedic and recommended ORIF  Patient was seen by cardiology for perioperative evaluation echocardiogram was done as above  Patient was deemed optimal for surgical intervention  Patient tolerated surgery well was monitored postoperatively  Patient did have mild decline in hemoglobin postoperatively but subsequently hemoglobin stabilized and remained stable  There was no evidence of overt bleeding  Patient remained clinically stable after the surgery and was progressing with physical therapy  Pain is controlled  Rehab was recommended and patient is being transferred to rehab  Patient will follow-up with orthopedics in 2 weeks  Continue on DVT prophylaxis for 6 weeks  Discussed incidental finding on CT scan patient reported that he was told in the past that he had some kind of nodule but would appreciate follow-up  Patient at this point is not sure whether he will follow-up with his physicians which he has not seen in the past but he agreed to receive referral to local physician  Patient will also will be referred to GI for anemia and prior history of erosive gastritis  Patient will be continued on PPI  Patient will be discharged today with follow-up with PCP, orthopedic, GI and hematology  We will also refer patient to cardiology for low EF  Please see above list of diagnoses and related plan for additional information  Condition at Discharge: stable     Discharge Day Visit / Exam:     Please refer to progress note    Discharge instructions/Information to patient and family:(Discharge Medications and Follow up):   See after visit summary for information provided to patient and family  Provisions for Follow-Up Care:  See after visit summary for information related to follow-up care and any pertinent home health orders        Disposition: Skilled nursing facility [facility] Readmission:  No     Discharge Statement:  I spent 40 minutes "discharging the patient  This time was spent on the day of discharge  I had direct contact with the patient on the day of discharge  Greater than 50% of the total time was spent examining patient, answering all patient questions, arranging and discussing plan of care with patient as well as directly providing post-discharge instructions  Additional time then spent on discharge activities  Coordinated with orthopedic regarding follow-up plan  Discussed with patient at length regarding need for rehabilitation after the surgery and follow-up for findings on CT scan and monitoring of lab  Attempted to call patient's sister but unable to connect to the number provided  Discharge Medications:  See after visit summary for reconciled discharge medications provided to patient and family  ** Please Note: \"This note has been constructed using a voice recognition system  Therefore there may be syntax, spelling, and/or grammatical errors  Please call if you have any questions   \"**      "

## 2023-03-28 NOTE — UTILIZATION REVIEW
NOTIFICATION OF ADMISSION DISCHARGE   This is a Notification of Discharge from [address]  Please be advised that this patient has been discharge from our facility  Below you will find the admission and discharge date and time including the patient’s disposition  UTILIZATION REVIEW CONTACT:  Griselda Liz  Utilization   Network Utilization Review Department  Phone: [phone] x carefully listen to the prompts  All voicemails are confidential   Email: [email or web address]     ADMISSION INFORMATION  PRESENTATION DATE: 3/25/2023  4:29 AM  OBERVATION ADMISSION DATE:   INPATIENT ADMISSION DATE: 3/25/23  8:08 AM   DISCHARGE DATE: 3/27/2023  6:12 PM   DISPOSITION:[facility] SNF/TCU/SNU    IMPORTANT INFORMATION:  Send all requests for admission clinical reviews, approved or denied determinations and any other requests to dedicated fax number below belonging to the campus where the patient is receiving treatment   List of dedicated fax numbers:  [address] DENIALS (Administrative/Medical Necessity) [phone]   [address] (Maternity/NICU/Pediatrics) [phone]   [facility] [phone]   [facility] [phone]   [address] [phone]   [address] [facility] [phone] [facility] [phone]   [address] [place], [place] [address] [phone]   [facility]  [phone] [facility] [facility] [phone] [facility] [address] [facility] [phone]

## 2023-03-29 DIAGNOSIS — S72.001A CLOSED FRACTURE OF RIGHT HIP, INITIAL ENCOUNTER (HCC): Primary | ICD-10-CM

## 2023-05-03 ENCOUNTER — CONSULT (OUTPATIENT)
Dept: GASTROENTEROLOGY | Facility: CLINIC | Age: 78
End: 2023-05-03

## 2023-05-03 DIAGNOSIS — D64.9 ANEMIA: Primary | ICD-10-CM

## 2023-05-03 RX ORDER — BISACODYL 5 MG/1
10 TABLET, DELAYED RELEASE ORAL ONCE
Qty: 2 TABLET | Refills: 0 | Status: SHIPPED | OUTPATIENT
Start: 2023-05-03 | End: 2023-05-03

## 2023-05-03 NOTE — PATIENT INSTRUCTIONS
Scheduled date of EGD/colonoscopy (as of today):please call to schedule  Physician performing EGD/colonoscopy: Bryan Jiang  Location of EGD/colonoscopy:   Desired bowel prep reviewed with patient: golytely  Instructions reviewed with patient by: Jose Poon  Clearances: hold LOVENOX morning of procedure if he is still taking it

## 2023-05-03 NOTE — ASSESSMENT & PLAN NOTE
Probable anemia of chronic disease  Rule out any colorectal lesions including polyps or malignancy     -Schedule for both EGD and colonoscopy  -High-fiber diet     -Advised him to stop pantoprazole since he has no stomach issues     -Patient was given instructions about the colonoscopy prep     -Patient was explained about the risks and benefits of the procedure  Risks including but not limited to bleeding, infection, perforation were explained in detail  Also explained about less than 100% sensitivity with the exam and other alternatives

## 2023-05-03 NOTE — PROGRESS NOTES
Consultation - [address] [facility]  Louise Vidals 1945 male         Chief Complaint: Anemia    HPI: 16-year-old male, resident of assisted living, with history of hypertension, schizophrenia was hospitalized in March because of right hip fracture and underwent ORIF, his hemoglobin on admission was 9 5 it went down to 7 8 postoperatively  Thinks he had a colonoscopy many years ago  Reports having regular bowel movements and denies any blood or mucus in the stool  Denies any heartburn or acid reflux  He was discharged home on pantoprazole from the hospital   Denies any abdominal pain, nausea or vomiting  Denies any difficulty swallowing  Chaperon: Ms Kena French: Review of Systems   Constitutional: Negative for activity change, appetite change, chills, diaphoresis, fatigue, fever and unexpected weight change  HENT: Negative for ear discharge, ear pain, facial swelling, hearing loss, nosebleeds, sore throat, tinnitus and voice change  Eyes: Negative for pain, discharge, redness, itching and visual disturbance  Respiratory: Negative for apnea, cough, chest tightness, shortness of breath and wheezing  Cardiovascular: Negative for chest pain and palpitations  Gastrointestinal:        As noted in HPI   Endocrine: Negative for cold intolerance, heat intolerance and polyuria  Genitourinary: Negative for difficulty urinating, dysuria, flank pain, hematuria and urgency  Musculoskeletal: Negative for arthralgias, back pain, gait problem, joint swelling and myalgias  Skin: Negative for rash and wound  Neurological: Negative for dizziness, tremors, seizures, speech difficulty, light-headedness, numbness and headaches  Hematological: Negative for adenopathy  Does not bruise/bleed easily  Psychiatric/Behavioral: Negative for agitation, behavioral problems and confusion  The patient is not nervous/anxious           Past Medical History:   Diagnosis Date    Hypertension     Schizophrenia [facility])       Past Surgical History:   Procedure Laterality Date    CHOLECYSTECTOMY      TX OPTX FEM SHFT FX W/INSJ IMED IMPLT W/WO SCREW Right 3/25/2023    Procedure: INSERTION NAIL IM FEMUR ANTEGRADE (TROCHANTERIC); Surgeon: Tyrone Narvaez MD;  Location: [facility];  Service: Orthopedics    TONSILECTOMY AND ADNOIDECTOMY       Social History     Socioeconomic History    Marital status: Single     Spouse name: Not on file    Number of children: Not on file    Years of education: Not on file    Highest education level: Not on file   Occupational History    Not on file   Tobacco Use    Smoking status: Never    Smokeless tobacco: Never   Substance and Sexual Activity    Alcohol use: Never    Drug use: Not on file    Sexual activity: Not on file   Other Topics Concern    Not on file   Social History Narrative    Not on file     Social Determinants of Health     Financial Resource Strain: Not on file   Food Insecurity: No Food Insecurity    Worried About [facility] [place] in the Last Year: Never true    Luis of Food in the Last Year: Never true   Transportation Needs: No Transportation Needs    Lack of Transportation (Medical): No    Lack of Transportation (Non-Medical): No   Physical Activity: Not on file   Stress: Not on file   Social Connections: Not on file   Intimate Partner Violence: Not on file   Housing Stability: Low Risk     Unable to Pay for Housing in the Last Year: No    Number of Places Lived in the Last Year: 1    Unstable Housing in the Last Year: No     History reviewed  No pertinent family history  Patient has no known allergies    Current Outpatient Medications   Medication Sig Dispense Refill    acetaminophen (TYLENOL) 325 mg tablet Take 2 tablets (650 mg total) by mouth every 6 (six) hours as needed for mild pain, headaches or fever  0    amLODIPine (NORVASC) 5 mg tablet Take 5 mg by mouth daily      aspirin 81 mg chewable tablet Chew 1 tablet (81 mg total) daily Do not start before March 28, 2023   0    bisacodyl (DULCOLAX) 5 mg EC tablet Take 2 tablets (10 mg total) by mouth once for 1 dose 2 tablet 0    clonazePAM (KlonoPIN) 0 25 MG disintegrating tablet Take 0 25 mg by mouth daily      clonazePAM (KlonoPIN) 0 5 mg tablet TAKE 1 TABLET ORALLY ONCE DAILY FOR ANXIETY      divalproex sodium (DEPAKOTE) 125 mg EC tablet Take 125 mg by mouth every 8 (eight) hours      docusate sodium (COLACE) 100 mg capsule Take 1 capsule (100 mg total) by mouth 2 (two) times a day  0    enoxaparin (LOVENOX) 40 mg/0 4 mL Inject 0 4 mL (40 mg total) under the skin every 24 hours Do not start before March 28, 2023   0    ferrous sulfate 324 (65 Fe) mg Take 1 tablet (324 mg total) by mouth 2 (two) times a day before meals  0    losartan (COZAAR) 50 mg tablet Take 50 mg by mouth daily      magnesium Oxide (MAG-OX) 400 mg TABS Take 1 tablet (400 mg total) by mouth 2 (two) times a day  0    metoprolol tartrate (LOPRESSOR) 50 mg tablet Take 50 mg by mouth every 12 (twelve) hours      oxyCODONE (ROXICODONE) 5 immediate release tablet Take 1 tablet (5 mg total) by mouth every 6 (six) hours as needed for moderate pain Max Daily Amount: 20 mg 5 tablet 0    polyethylene glycol (GOLYTELY) 4000 mL solution Take 4,000 mL by mouth once for 1 dose 4000 mL 0    polyethylene glycol (MIRALAX) 17 g packet Take 17 g by mouth daily as needed (constipation)  0     No current facility-administered medications for this visit  There were no vitals taken for this visit  PHYSICAL EXAM: Physical Exam  Constitutional:       Appearance: He is well-developed  HENT:      Head: Normocephalic and atraumatic  Eyes:      General: No scleral icterus  Right eye: No discharge  Left eye: No discharge  Conjunctiva/sclera: Conjunctivae normal       Pupils: Pupils are equal, round, and reactive to light  Neck:      Thyroid: No thyromegaly  Vascular: No JVD  Trachea: No tracheal deviation  Cardiovascular:      Rate and Rhythm: Normal rate and regular rhythm  Heart sounds: Murmur heard  No friction rub  No gallop  Pulmonary:      Effort: Pulmonary effort is normal  No respiratory distress  Breath sounds: Normal breath sounds  No wheezing or rales  Chest:      Chest wall: No tenderness  Abdominal:      General: Bowel sounds are normal  There is no distension  Palpations: Abdomen is soft  There is no mass  Tenderness: There is no abdominal tenderness  There is no guarding or rebound  Hernia: No hernia is present  Musculoskeletal:      Cervical back: Neck supple  Lymphadenopathy:      Cervical: No cervical adenopathy  Skin:     General: Skin is warm and dry  Findings: No erythema or rash  Neurological:      Mental Status: He is alert and oriented to person, place, and time  Psychiatric:         Behavior: Behavior normal          Thought Content: Thought content normal           Lab Results   Component Value Date    WBC 11 20 (H) 03/27/2023    HGB 7 8 (L) 03/27/2023    HCT 25 5 (L) 03/27/2023    MCV 83 03/27/2023     03/27/2023     Lab Results   Component Value Date    CALCIUM 8 0 (L) 03/27/2023    K 3 8 03/27/2023    CO2 26 03/27/2023     03/27/2023    BUN 14 03/27/2023    CREATININE 0 52 (L) 03/27/2023     Lab Results   Component Value Date    ALT 10 03/25/2023    AST 12 (L) 03/25/2023    ALKPHOS 89 03/25/2023     Lab Results   Component Value Date    INR 1 17 03/25/2023    PROTIME 15 0 (H) 03/25/2023       XR hip/pelv 2-3 vws right if performed    Result Date: 4/14/2023  Impression: Stable anatomic alignment status post ORIF for a right intertrochanteric hip fracture without hardware complication  Workstation performed: [id number]       ASSESSMENT & PLAN:    Anemia  Probable anemia of chronic disease    Rule out any colorectal lesions including polyps or malignancy     -Schedule for both EGD and colonoscopy  -High-fiber diet     -Advised him to stop pantoprazole since he has no stomach issues     -Patient was given instructions about the colonoscopy prep     -Patient was explained about the risks and benefits of the procedure  Risks including but not limited to bleeding, infection, perforation were explained in detail  Also explained about less than 100% sensitivity with the exam and other alternatives

## 2023-05-05 DIAGNOSIS — S72.001A CLOSED FRACTURE OF RIGHT HIP, INITIAL ENCOUNTER (HCC): Primary | ICD-10-CM

## 2023-05-08 ENCOUNTER — CONSULT (OUTPATIENT)
Dept: HEMATOLOGY ONCOLOGY | Facility: MEDICAL CENTER | Age: 78
End: 2023-05-08

## 2023-05-08 VITALS
TEMPERATURE: 97.8 F | DIASTOLIC BLOOD PRESSURE: 58 MMHG | OXYGEN SATURATION: 98 % | HEART RATE: 64 BPM | SYSTOLIC BLOOD PRESSURE: 112 MMHG | RESPIRATION RATE: 16 BRPM

## 2023-05-08 DIAGNOSIS — R93.89 ABNORMAL CT SCAN: ICD-10-CM

## 2023-05-08 DIAGNOSIS — S72.001A CLOSED FRACTURE OF RIGHT HIP, INITIAL ENCOUNTER (HCC): ICD-10-CM

## 2023-05-08 DIAGNOSIS — D64.9 ANEMIA, UNSPECIFIED TYPE: Primary | ICD-10-CM

## 2023-05-08 NOTE — PROGRESS NOTES
Wendy Hernandez  1945  [facility]  [facility]  [facility] [facility] [id number]  HEMATOLOGY/ONCOLOGY CONSULTATION REPORT    DISCUSSION/SUMMARY:    25-year-old male recently admitted to the hospital with a right hip fracture status post fall; underwent ORIF  Part of the work-up demonstrated a 17 mm soft tissue nodule adjacent to the rectosigmoid junction to the right of midline  Mr Juaquin Zamora feels well, patient denies any pain in this area  We discussed options  Patient understands that the etiology for this lesion is not clear (benign versus a possible growing malignancy)  We discussed different options including continued surveillance or a more proactive pathway (IR evaluation for biopsy now)  Mr Juaquin Zamora does not want to undergo any type of biopsy or invasive procedure at this time  Patient agrees to return to the office in 4 months  Prior to the follow-up office visit, Mr Juaquin Zamora will go for repeat CAT scan of the pelvis (along with the chest and abdomen) and an anemia work-up  We discussed what to monitor for as far as worsening right hip or buttocks pain, GI issues,  issues, unplanned weight loss, bleeding, decreased activities, progressive fatigue, respiratory issues etc   Patient knows to call the hematology/oncology office if there are any other questions or concerns  Carefully review your medication list and verify that the list is accurate and up-to-date  Please call the hematology/oncology office if there are medications missing from the list, medications on the list that you are not currently taking or if there is a dosage or instruction that is different from how you're taking that medication      Patient goals and areas of care: Repeat scans, anemia work-up  Barriers to care: none  Patient is able to self-care   ______________________________________________________________________________________    Chief Complaint   Patient presents with   • Consult     Abnormal CT   • Rectus sigmoid junction nodule     History of Present Illness: 34-year-old male admitted to the hospital in late March 2023 after a fall  Patient usually uses a walker but Mr Oralia Apple fell and fractured his right hip  Patient also with a history of abnormal EKG, hypertension, abnormal CAT scan, leukocytosis and anemia  Patient was eventually discharged and [facility] here in [place]  Because of the prior CAT scan demonstrating a 17 mm soft tissue nodule in the rectus muscle, patient was referred to oncology  Mr Oralia Apple states feeling okay, baseline  No pain control issues, right hip pain is now gone  Appetite is good, weight is stable  No fevers, chills or sweats  No respiratory issues, no cough, sputum or hemoptysis  No GI or  issues or bleeding  Bowel movements are normal, no bleeding  Activities are limited but about the same as before  Review of Systems   Constitutional: Negative  HENT: Negative  Eyes: Negative  Respiratory: Negative  Cardiovascular: Negative  Gastrointestinal: Negative  Endocrine: Negative  Genitourinary: Negative  Musculoskeletal: Negative  Skin: Negative  Allergic/Immunologic: Negative  Neurological: Negative  Hematological: Negative  Psychiatric/Behavioral: Negative  All other systems reviewed and are negative  Patient Active Problem List   Diagnosis   • Closed right hip fracture (HCC)   • Hypertension   • Schizophrenia ([address] )   • Anemia   • Abnormal CT scan   • Abnormal EKG     Past Medical History:   Diagnosis Date   • Hypertension    • Schizophrenia [facility])      Past Surgical History:   Procedure Laterality Date   • CHOLECYSTECTOMY     • [place] OPTX FEM SHFT FX W/INSJ IMED IMPLT W/WO SCREW Right 3/25/2023    Procedure: INSERTION NAIL IM FEMUR ANTEGRADE (TROCHANTERIC);   Surgeon: Agustina Moy MD;  Location: [address] [place] [address];  Service: Orthopedics   • TONSILECTOMY AND ADNOIDECTOMY       Family history: No children, no known familial or genetic diseases    Social History     Socioeconomic History   • Marital status: Single     Spouse name: Not on file   • Number of children: Not on file   • Years of education: Not on file   • Highest education level: Not on file   Occupational History   • Not on file   Tobacco Use   • Smoking status: Never   • Smokeless tobacco: Never   Substance and Sexual Activity   • Alcohol use: Never   • Drug use: Not on file   • Sexual activity: Not on file   Other Topics Concern   • Not on file   Social History Narrative   • Not on file     Social Determinants of Health     Financial Resource Strain: Not on file   Food Insecurity: No Food Insecurity   • Worried About Running Out of Food in the Last Year: Never true   • Ran Out of Food in the Last Year: Never true   Transportation Needs: No Transportation Needs   • Lack of Transportation (Medical): No   • Lack of Transportation (Non-Medical):  No   Physical Activity: Not on file   Stress: Not on file   Social Connections: Not on file   Intimate Partner Violence: Not on file   Housing Stability: Low Risk    • Unable to Pay for Housing in the Last Year: No   • Number of Places Lived in the Last Year: 1   • Unstable Housing in the Last Year: No   Social history: Previously patient worked at a [employer], no tobacco use for many years, no drug abuse, patient states having an occasional beer    Current Outpatient Medications:   •  acetaminophen (TYLENOL) 325 mg tablet, Take 2 tablets (650 mg total) by mouth every 6 (six) hours as needed for mild pain, headaches or fever, Disp: , Rfl: 0  •  amLODIPine (NORVASC) 5 mg tablet, Take 5 mg by mouth daily, Disp: , Rfl:   •  aspirin 81 mg chewable tablet, Chew 1 tablet (81 mg total) daily Do not start before March 28, 2023 , Disp: , Rfl: 0  •  clonazePAM (KlonoPIN) 0 25 MG disintegrating tablet, Take 0 25 mg by mouth daily, Disp: , Rfl:   •  clonazePAM (KlonoPIN) 0 5 mg tablet, TAKE 1 TABLET ORALLY ONCE DAILY FOR ANXIETY, Disp: , Rfl:   •  divalproex sodium (DEPAKOTE) 125 mg EC tablet, Take 125 mg by mouth every 8 (eight) hours, Disp: , Rfl:   •  docusate sodium (COLACE) 100 mg capsule, Take 1 capsule (100 mg total) by mouth 2 (two) times a day, Disp: , Rfl: 0  •  ferrous sulfate 324 (65 Fe) mg, Take 1 tablet (324 mg total) by mouth 2 (two) times a day before meals, Disp: , Rfl: 0  •  losartan (COZAAR) 50 mg tablet, Take 50 mg by mouth daily, Disp: , Rfl:   •  magnesium Oxide (MAG-OX) 400 mg TABS, Take 1 tablet (400 mg total) by mouth 2 (two) times a day, Disp: , Rfl: 0  •  metoprolol tartrate (LOPRESSOR) 50 mg tablet, Take 50 mg by mouth every 12 (twelve) hours, Disp: , Rfl:   •  oxyCODONE (ROXICODONE) 5 immediate release tablet, Take 1 tablet (5 mg total) by mouth every 6 (six) hours as needed for moderate pain Max Daily Amount: 20 mg, Disp: 5 tablet, Rfl: 0  •  polyethylene glycol (MIRALAX) 17 g packet, Take 17 g by mouth daily as needed (constipation), Disp: , Rfl: 0  •  bisacodyl (DULCOLAX) 5 mg EC tablet, Take 2 tablets (10 mg total) by mouth once for 1 dose (Patient not taking: Reported on 5/8/2023), Disp: 2 tablet, Rfl: 0  •  enoxaparin (LOVENOX) 40 mg/0 4 mL, Inject 0 4 mL (40 mg total) under the skin every 24 hours Do not start before March 28, 2023 , Disp: , Rfl: 0  •  polyethylene glycol (GOLYTELY) 4000 mL solution, Take 4,000 mL by mouth once for 1 dose (Patient not taking: Reported on 5/8/2023), Disp: 4000 mL, Rfl: 0    No Known Allergies    Vitals:    05/08/23 1517   BP: 112/58   Pulse: 64   Resp: 16   Temp: 97 8 °F (36 6 °C)   SpO2: 98%     Physical Exam  Constitutional:       Appearance: He is well-developed  Comments: Thin but relatively well nourished male, no respiratory distress, no signs of pain   HENT:      Head: Normocephalic and atraumatic        Right Ear: External ear normal       Left Ear: External ear normal  Eyes:      Conjunctiva/sclera: Conjunctivae normal       Pupils: Pupils are equal, round, and reactive to light  Cardiovascular:      Rate and Rhythm: Normal rate and regular rhythm  Heart sounds: Normal heart sounds  Pulmonary:      Effort: Pulmonary effort is normal       Breath sounds: Normal breath sounds  Comments: Fair to good entry bilaterally, scattered bilateral rhonchi  Abdominal:      General: Bowel sounds are normal       Palpations: Abdomen is soft  Comments: + Bowel sounds, nontender, soft, no guarding   Musculoskeletal:         General: Normal range of motion  Cervical back: Normal range of motion and neck supple  Skin:     General: Skin is warm  Comments: Pale, warm, moist, no petechiae or ecchymoses   Neurological:      Mental Status: He is alert and oriented to person, place, and time  Deep Tendon Reflexes: Reflexes are normal and symmetric  Psychiatric:         Behavior: Behavior normal          Thought Content: Thought content normal          Judgment: Judgment normal      Extremities: No lower extreme edema bilaterally, no cords, pulses are 1+  Lymphatics: No adenopathy in the neck, supraclavicular region, axilla bilaterally    Labs    3/27/2023 WBC = 11 2 hemoglobin = 7 8 hematocrit = 25 5 MCV = 83 RDW = 17 platelet = 407 BUN = 14 creatinine = 0 52    3/25/2023 iron = 22 iron saturation = 10% TIBC = 231 ferritin = 151 PT = 15 INR = 1 17 PTT = 39 folate = 15 4 B12 = 227    Imaging    4/14/2023 XR hip/pelv 2-3 vws right if performed    Result Date: 4/14/2023  Narrative: RIG  Impression: Stable anatomic alignment status post ORIF for a right intertrochanteric hip fracture without hardware complication  3/25/2023 CAT scan pelvis without contrast    ABDOMINOPELVIC CAVITY:  No ascites or free intraperitoneal air  There is a 17 x 14 mm soft tissue nodule noted adjacent to the rectus sigmoid junction, to the right of midline (series 3, image 51)       OSSEOUS STRUCTURES:  There is an acute mildly displaced comminuted intertrochanteric fracture of the proximal right femur with the fracture extending to the proximal femoral diaphysis  The right hip joint space is intact        IMPRESSION:     Acute mildly displaced comminuted intertrochanteric fracture of the proximal right femur  The right hip joint space is intact      No free air or free fluid within the pelvis     17 mm soft tissue nodule within the deep right pelvis, adjacent to the right through sigmoid colon, which may represent an enlarged lymph node  No prior studies are available for comparison  Tissue sampling and/or PET/CT scan could be performed for further evaluation  Considerations related to the patient's age and/or comorbidities may be used to alter these recommendations

## 2023-09-08 ENCOUNTER — TELEPHONE (OUTPATIENT)
Dept: HEMATOLOGY ONCOLOGY | Facility: CLINIC | Age: 78
End: 2023-09-08

## (undated) DEVICE — TOWEL SET X-RAY

## (undated) DEVICE — 2.5MM REAMING ROD WITH BALL TIP/950MM-STERILE

## (undated) DEVICE — SPONGE LAP 18 X 18 IN STRL RFD

## (undated) DEVICE — NEPTUNE E-SEP SMOKE EVACUATION PENCIL, COATED, 70MM BLADE, PUSH BUTTON SWITCH: Brand: NEPTUNE E-SEP

## (undated) DEVICE — DRESSING MEPILEX AG BORDER POST-OP 4 X 6 IN

## (undated) DEVICE — BASIC DOUBLE BASIN 2-LF: Brand: MEDLINE INDUSTRIES, INC.

## (undated) DEVICE — PACK GENERAL LF

## (undated) DEVICE — 3M™ STERI-DRAPE™  ISOLATION DRAPE WITH INCISE FILM AND POUCH 1017: Brand: STERI-DRAPE™

## (undated) DEVICE — DRESSING MEPILEX AG BORDER 4 X 4 IN

## (undated) DEVICE — 4.2MM THREE-FLUTED DRILL BIT QC/330MM/100MM CALIBRATION

## (undated) DEVICE — DRAPE EQUIPMENT RF WAND

## (undated) DEVICE — CHLORAPREP HI-LITE 26ML ORANGE

## (undated) DEVICE — VIOLET BRAIDED (POLYGLACTIN 910), SYNTHETIC ABSORBABLE SUTURE: Brand: COATED VICRYL

## (undated) DEVICE — GLOVE INDICATOR PI UNDERGLOVE SZ 9 BLUE

## (undated) DEVICE — 3.2MM GUIDE WIRE 400MM

## (undated) DEVICE — GLOVE SRG BIOGEL 9

## (undated) DEVICE — ARTHROSCOPY FLOOR MAT